# Patient Record
Sex: FEMALE | Race: WHITE | Employment: OTHER | URBAN - NONMETROPOLITAN AREA
[De-identification: names, ages, dates, MRNs, and addresses within clinical notes are randomized per-mention and may not be internally consistent; named-entity substitution may affect disease eponyms.]

---

## 2018-07-19 ENCOUNTER — APPOINTMENT (OUTPATIENT)
Dept: CT IMAGING | Age: 43
End: 2018-07-19
Payer: COMMERCIAL

## 2018-07-19 ENCOUNTER — HOSPITAL ENCOUNTER (EMERGENCY)
Age: 43
Discharge: HOME | End: 2018-07-19
Attending: EMERGENCY MEDICINE
Payer: COMMERCIAL

## 2018-07-19 ENCOUNTER — APPOINTMENT (OUTPATIENT)
Dept: GENERAL RADIOLOGY | Age: 43
End: 2018-07-19
Payer: COMMERCIAL

## 2018-07-19 VITALS
WEIGHT: 147 LBS | SYSTOLIC BLOOD PRESSURE: 155 MMHG | BODY MASS INDEX: 23.07 KG/M2 | HEART RATE: 81 BPM | DIASTOLIC BLOOD PRESSURE: 103 MMHG | OXYGEN SATURATION: 100 % | HEIGHT: 67 IN | RESPIRATION RATE: 20 BRPM
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
## 2018-07-19 DIAGNOSIS — M54.2 NECK PAIN: ICD-10-CM

## 2018-07-19 DIAGNOSIS — M54.50 ACUTE BILATERAL LOW BACK PAIN WITHOUT SCIATICA: ICD-10-CM

## 2018-07-19 PROCEDURE — 72125 CT NECK SPINE W/O DYE: CPT

## 2018-07-19 PROCEDURE — 71046 X-RAY EXAM CHEST 2 VIEWS: CPT

## 2018-07-19 PROCEDURE — 6370000000 HC RX 637 (ALT 250 FOR IP): Performed by: EMERGENCY MEDICINE

## 2018-07-19 PROCEDURE — 99284 EMERGENCY DEPT VISIT MOD MDM: CPT

## 2018-07-19 PROCEDURE — 72100 X-RAY EXAM L-S SPINE 2/3 VWS: CPT

## 2018-07-19 RX ORDER — IBUPROFEN 600 MG/1
600 TABLET ORAL ONCE
Status: COMPLETED | OUTPATIENT
Start: 2018-07-19 | End: 2018-07-19

## 2018-07-19 RX ADMIN — IBUPROFEN 600 MG: 600 TABLET ORAL at 18:37

## 2018-07-19 ASSESSMENT — PAIN SCALES - GENERAL
PAINLEVEL_OUTOF10: 8
PAINLEVEL_OUTOF10: 7

## 2018-07-19 ASSESSMENT — PAIN DESCRIPTION - LOCATION
LOCATION: BACK
LOCATION_2: NECK

## 2018-07-19 ASSESSMENT — PAIN DESCRIPTION - INTENSITY: RATING_2: 3

## 2018-07-19 ASSESSMENT — PAIN DESCRIPTION - PAIN TYPE: TYPE: ACUTE PAIN

## 2018-07-19 ASSESSMENT — PAIN DESCRIPTION - ORIENTATION: ORIENTATION: LOWER

## 2018-07-19 ASSESSMENT — PAIN DESCRIPTION - DESCRIPTORS: DESCRIPTORS: CONSTANT;ACHING
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
## 2022-10-28 ENCOUNTER — HOSPITAL ENCOUNTER (EMERGENCY)
Age: 47
Discharge: HOME | End: 2022-10-28
Attending: EMERGENCY MEDICINE
Payer: COMMERCIAL

## 2022-10-28 ENCOUNTER — APPOINTMENT (OUTPATIENT)
Dept: CT IMAGING | Age: 47
End: 2022-10-28
Payer: COMMERCIAL

## 2022-10-28 VITALS
SYSTOLIC BLOOD PRESSURE: 126 MMHG | RESPIRATION RATE: 18 BRPM | BODY MASS INDEX: 28.88 KG/M2 | OXYGEN SATURATION: 93 % | WEIGHT: 184 LBS | TEMPERATURE: 97.6 F | HEART RATE: 79 BPM | HEIGHT: 67 IN | DIASTOLIC BLOOD PRESSURE: 68 MMHG

## 2022-10-28 DIAGNOSIS — N83.201 CYST OF RIGHT OVARY: ICD-10-CM

## 2022-10-28 DIAGNOSIS — S00.83XA CONTUSION OF FOREHEAD, INITIAL ENCOUNTER: ICD-10-CM
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
## 2022-10-28 LAB
ANION GAP SERPL CALCULATED.3IONS-SCNC: 11 MMOL/L (ref 9–17)
BUN BLDV-MCNC: 18 MG/DL (ref 6–20)
BUN/CREAT BLD: 25 (ref 9–20)
CALCIUM SERPL-MCNC: 8.9 MG/DL (ref 8.6–10.4)
CHLORIDE BLD-SCNC: 107 MMOL/L (ref 98–107)
CO2: 24 MMOL/L (ref 20–31)
CREAT SERPL-MCNC: 0.71 MG/DL (ref 0.5–0.9)
ETHANOL PERCENT: 0.23 %
ETHANOL: 233 MG/DL
GFR SERPL CREATININE-BSD FRML MDRD: >60 ML/MIN/1.73M2
GLUCOSE BLD-MCNC: 117 MG/DL (ref 70–99)
HCT VFR BLD CALC: 41.5 % (ref 36.3–47.1)
HEMOGLOBIN: 14.1 G/DL (ref 11.9–15.1)
MCH RBC QN AUTO: 32 PG (ref 25.2–33.5)
MCHC RBC AUTO-ENTMCNC: 34 G/DL (ref 28.4–34.8)
MCV RBC AUTO: 94.3 FL (ref 82.6–102.9)
NRBC AUTOMATED: 0 PER 100 WBC
PDW BLD-RTO: 12.6 % (ref 11.8–14.4)
PLATELET # BLD: 173 K/UL (ref 138–453)
PMV BLD AUTO: 10.4 FL (ref 8.1–13.5)
POTASSIUM SERPL-SCNC: 4 MMOL/L (ref 3.7–5.3)
RBC # BLD: 4.4 M/UL (ref 3.95–5.11)
SODIUM BLD-SCNC: 142 MMOL/L (ref 135–144)
WBC # BLD: 5.6 K/UL (ref 3.5–11.3)

## 2022-10-28 PROCEDURE — 70450 CT HEAD/BRAIN W/O DYE: CPT

## 2022-10-28 PROCEDURE — 85027 COMPLETE CBC AUTOMATED: CPT

## 2022-10-28 PROCEDURE — 36415 COLL VENOUS BLD VENIPUNCTURE: CPT

## 2022-10-28 PROCEDURE — 74177 CT ABD & PELVIS W/CONTRAST: CPT

## 2022-10-28 PROCEDURE — G0480 DRUG TEST DEF 1-7 CLASSES: HCPCS

## 2022-10-28 PROCEDURE — 80048 BASIC METABOLIC PNL TOTAL CA: CPT

## 2022-10-28 PROCEDURE — 6360000004 HC RX CONTRAST MEDICATION: Performed by: EMERGENCY MEDICINE

## 2022-10-28 PROCEDURE — 99285 EMERGENCY DEPT VISIT HI MDM: CPT

## 2022-10-28 PROCEDURE — 72125 CT NECK SPINE W/O DYE: CPT

## 2022-10-28 RX ORDER — ACETAMINOPHEN 325 MG/1
650 TABLET ORAL EVERY 6 HOURS PRN
Qty: 40 TABLET | Refills: 0 | Status: SHIPPED | OUTPATIENT
Start: 2022-10-28

## 2022-10-28 RX ORDER — IBUPROFEN 800 MG/1
800 TABLET ORAL EVERY 8 HOURS PRN
Qty: 21 TABLET | Refills: 0 | Status: SHIPPED | OUTPATIENT
Start: 2022-10-28

## 2022-10-28 RX ORDER — CYCLOBENZAPRINE HCL 10 MG
10 TABLET ORAL 3 TIMES DAILY PRN
Qty: 21 TABLET | Refills: 0 | Status: SHIPPED | OUTPATIENT
Start: 2022-10-28 | End: 2022-11-07

## 2022-10-28 RX ADMIN — IOPAMIDOL 75 ML: 755 INJECTION, SOLUTION INTRAVENOUS at 21:26

## 2022-10-28 ASSESSMENT — PAIN SCALES - GENERAL: PAINLEVEL_OUTOF10: 10

## 2022-10-28 ASSESSMENT — PAIN - FUNCTIONAL ASSESSMENT: PAIN_FUNCTIONAL_ASSESSMENT: 0-10

## 2022-10-28 ASSESSMENT — PAIN DESCRIPTION - ORIENTATION: ORIENTATION: LEFT

## 2022-10-28 ASSESSMENT — PAIN DESCRIPTION - LOCATION: LOCATION: RIB CAGE

## 2022-10-29 NOTE — ED NOTES
Called CT to see when patient can come to CT. Pt second on list to go for scans.   Pt A&O and resting comfortably      Estee Hager RN  10/28/22 5545
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
## 2022-10-29 NOTE — ED NOTES
Patient able to walk in hallway, gait steady. States able to go home.        Katie Luna RN  10/28/22 3938

## 2022-10-29 NOTE — DISCHARGE INSTRUCTIONS
Follow-up with your OB/GYN regarding your right ovarian cyst.  You will wake up tomorrow in significant pain. Please use pain medication as prescribed to help with your discomfort, please take muscle relaxant to help also with your discomfort okay to apply ice to areas of pain and swelling, and a use your incentive spirometer to help with inhalation and exhalation. Please use this every hour throughout the day. Return to ER for worsening pain shortness of breath difficulty breathing, numbness, tingling or weakness.

## 2022-10-29 NOTE — ED NOTES
Pt given incentive spirometer and demonstrated appropriate use.   Pt ambulatory without difficutly upon dc and no questions voiced     Maine Zuñiga RN  10/28/22 3687
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
## 2022-10-30 ASSESSMENT — ENCOUNTER SYMPTOMS
COUGH: 0
NAUSEA: 0
SORE THROAT: 0
CONSTIPATION: 0
ABDOMINAL DISTENTION: 0
WHEEZING: 0
RHINORRHEA: 0
DIARRHEA: 0
VOMITING: 0
SHORTNESS OF BREATH: 0

## 2022-12-13 ENCOUNTER — OFFICE VISIT (OUTPATIENT)
Dept: GASTROENTEROLOGY | Age: 47
End: 2022-12-13

## 2022-12-13 VITALS
WEIGHT: 188.3 LBS | BODY MASS INDEX: 29.55 KG/M2 | RESPIRATION RATE: 16 BRPM | SYSTOLIC BLOOD PRESSURE: 150 MMHG | HEIGHT: 67 IN | TEMPERATURE: 97.2 F | HEART RATE: 75 BPM | DIASTOLIC BLOOD PRESSURE: 94 MMHG

## 2022-12-13 DIAGNOSIS — K58.0 IRRITABLE BOWEL SYNDROME WITH DIARRHEA: Primary | ICD-10-CM

## 2022-12-13 RX ORDER — ESCITALOPRAM OXALATE 10 MG/1
10 TABLET ORAL DAILY
COMMUNITY

## 2022-12-13 RX ORDER — HYOSCYAMINE SULFATE 0.125 MG
125 TABLET ORAL 2 TIMES DAILY PRN
COMMUNITY

## 2022-12-13 ASSESSMENT — ENCOUNTER SYMPTOMS: DIARRHEA: 1

## 2022-12-13 NOTE — PROGRESS NOTES
[facility] PART OF [place] [address] [address] [place]  Dept: [phone]    Chief Complaint   Patient presents with    New Patient     IBS-has been seeing a GI is Kellie Amin who is leaving so wanted to change to here. Doing well on Hyoscyamine. Has had a colonoscopy around June 2020-normal. Denies family hx of colon cancer but daughter does have Crohn's. HPI     Lisandro Gay is a 50 y.o. female with a past medical history of irritable bowel syndrome who presents to [facility]. She states she was diagnosed with IBS diarrhea which depends on what she eats. She states that she has been on hyoscyamine which has been helpful. She states that she also has been following a version of FODMAP diet which she memorized. She endorses about 1-2 bowel movement a day. She reports that her last colonoscopy as in 06/2020 with no concerning findings. She reports that her daughter has Crohn's disease. Past Medical History:   Diagnosis Date    IBS (irritable bowel syndrome)     Irritable bowel syndrome         Past Surgical History:   Procedure Laterality Date    CHOLECYSTECTOMY  02/2014    LAPAROSCOPY      for a \"kinked bowel\"    OTHER SURGICAL HISTORY      for varicose veins left ovary        Family History   Problem Relation Age of Onset    Cancer Mother     High Blood Pressure Mother     Diabetes Mother     Cancer Father     Celiac Disease Sister     Crohn's Disease Daughter         Review of Systems   Gastrointestinal:  Positive for diarrhea. Psychiatric/Behavioral:  The patient is nervous/anxious. Depression   All other systems reviewed and are negative.      BP (!) 150/94 (Site: Left Upper Arm, Position: Sitting, Cuff Size: Medium Adult)   Pulse 75   Temp 97.2 °F (36.2 °C) (Temporal)   Resp 16   Ht 5' 7\" (1.702 m)   Wt 188 lb 4.8 oz (85.4 kg)   BMI 29.49 kg/m²      Physical Exam  Constitutional:       Appearance: Normal appearance. HENT:      Head: Normocephalic. Right Ear: External ear normal.      Left Ear: External ear normal.      Nose: Nose normal.   Eyes:      Extraocular Movements: Extraocular movements intact. Pupils: Pupils are equal, round, and reactive to light. Cardiovascular:      Rate and Rhythm: Normal rate. Pulses: Normal pulses. Pulmonary:      Effort: Pulmonary effort is normal.   Abdominal:      General: Bowel sounds are normal.      Palpations: Abdomen is soft. Musculoskeletal:         General: Normal range of motion. Cervical back: Normal range of motion. Skin:     General: Skin is warm. Neurological:      General: No focal deficit present. Mental Status: She is alert and oriented to person, place, and time. Psychiatric:         Mood and Affect: Mood normal.        Lab Results   Component Value Date    WBC 5.6 10/28/2022    HGB 14.1 10/28/2022    HCT 41.5 10/28/2022    MCV 94.3 10/28/2022     10/28/2022       Lab Results   Component Value Date     10/28/2022    K 4.0 10/28/2022     10/28/2022    CO2 24 10/28/2022    BUN 18 10/28/2022    CREATININE 0.71 10/28/2022    GLUCOSE 117 (H) 10/28/2022    CALCIUM 8.9 10/28/2022    LABGLOM >60 10/28/2022    GFRAA >60 12/06/2016       Assessment:  Gala Floyd is a 50 y.o. female with a past medical history of irritable bowel syndrome whose last colonoscopy was in 2020 with no concerning findings per her account. Considering that her symptoms are associated with her diet, FODMAP intolerance is a consideration. Gluten intolerance will need to be ruled out with Celiac disease testing. Plan:  1. Irritable bowel syndrome with diarrhea  - FODMAP intolerance was discussed with the provision of the FODMAP pamphlet. Details were provided on foods to avoid fr the next 6 weeks as well as foods that low or free of FODMAPs.  Ideally, the dietary discretion is for 6 weeks   - Endomysial Antibody, IgA; Future  - IgA; Future  - Tissue Transglutaminase, IgA; Future    2. Follow-up in 8 weeks or sooner    Spent 20 minutes with the patient with greater than 50 percent of the time was spent on face-to-face time in discussion with the patient regarding diagnostic options/results, treatment options, counseling, and follow-up plan.   Electronically signed by Polly Victoria MD on 12/13/22 at 4:07 PM EST

## 2022-12-13 NOTE — PATIENT INSTRUCTIONS
SURVEY:    You may be receiving a survey from [email or web address] regarding your visit today. Please complete the survey to enable us to provide the highest quality of care to you and your family. If you cannot score us a very good on any question, please call the office to discuss how we could have made your experience a very good one. Thank you.

## 2023-03-14 ENCOUNTER — OFFICE VISIT (OUTPATIENT)
Dept: GASTROENTEROLOGY | Age: 48
End: 2023-03-14
Payer: COMMERCIAL

## 2023-03-14 VITALS
WEIGHT: 193.8 LBS | HEIGHT: 67 IN | DIASTOLIC BLOOD PRESSURE: 92 MMHG | RESPIRATION RATE: 18 BRPM | BODY MASS INDEX: 30.42 KG/M2 | SYSTOLIC BLOOD PRESSURE: 152 MMHG | TEMPERATURE: 97.8 F | HEART RATE: 79 BPM

## 2023-03-14 DIAGNOSIS — K58.9 IRRITABLE BOWEL SYNDROME WITHOUT DIARRHEA: Primary | ICD-10-CM

## 2023-03-14 PROCEDURE — 99212 OFFICE O/P EST SF 10 MIN: CPT | Performed by: INTERNAL MEDICINE

## 2023-03-14 RX ORDER — LISINOPRIL 20 MG/1
20 TABLET ORAL DAILY
COMMUNITY

## 2023-03-14 RX ORDER — PHENTERMINE HYDROCHLORIDE 37.5 MG/1
37.5 TABLET ORAL
COMMUNITY

## 2023-03-14 NOTE — PATIENT INSTRUCTIONS
SURVEY:    You may be receiving a survey from [employer] regarding your visit today. Please complete the survey to enable us to provide the highest quality of care to you and your family. If you cannot score us a very good on any question, please call the office to discuss how we could have made your experience a very good one. Thank you.

## 2023-03-14 NOTE — PROGRESS NOTES
[facility] [place] [address] [address]  Dept: [phone]    Chief Complaint   Patient presents with    Follow-up     F/u IBS-doing much better-denies any diarrhea. HPI from 12/13/2022  Wojciech Delgado is a 50 y.o. female with a past medical history of irritable bowel syndrome who presents to [facility] care. She states she was diagnosed with IBS diarrhea which depends on what she eats. She states that she has been on hyoscyamine which has been helpful. She states that she also has been following a version of FODMAP diet which she memorized. She endorses about 1-2 bowel movement a day. She reports that her last colonoscopy as in 06/2020 with no concerning findings. She reports that her daughter has Crohn's disease. Interval history 03/14/2023  She states that she has noticed that she is no longer bloated or having diarrhea since she completed the FODMAP diet - eliminating onions and brussel sprouts    Past Medical History:   Diagnosis Date    IBS (irritable bowel syndrome)     Irritable bowel syndrome         Past Surgical History:   Procedure Laterality Date    CHOLECYSTECTOMY  02/2014    LAPAROSCOPY      for a \"kinked bowel\"    OTHER SURGICAL HISTORY      for varicose veins left ovary        Family History   Problem Relation Age of Onset    Cancer Mother     High Blood Pressure Mother     Diabetes Mother     Cancer Father     Celiac Disease Sister     Crohn's Disease Daughter         Review of Systems   Gastrointestinal:         Irritable bowel syndrome   All other systems reviewed and are negative.      BP (!) 152/92 (Site: Left Upper Arm, Position: Sitting, Cuff Size: Medium Adult)   Pulse 79   Temp 97.8 °F (36.6 °C) (Temporal)   Resp 18   Ht 5' 7\" (1.702 m)   Wt 193 lb 12.8 oz (87.9 kg)   BMI 30.35 kg/m²      Physical Exam  Constitutional:       Appearance: Normal appearance. HENT:      Head: Normocephalic. Right Ear: External ear normal.      Left Ear: External ear normal.      Nose: Nose normal.      Mouth/Throat:      Mouth: Mucous membranes are moist.   Eyes:      Extraocular Movements: Extraocular movements intact. Pupils: Pupils are equal, round, and reactive to light. Cardiovascular:      Rate and Rhythm: Normal rate and regular rhythm. Pulses: Normal pulses. Heart sounds: Normal heart sounds. Pulmonary:      Effort: Pulmonary effort is normal.   Abdominal:      General: Bowel sounds are normal.      Palpations: Abdomen is soft. Musculoskeletal:         General: Normal range of motion. Cervical back: Normal range of motion. Skin:     General: Skin is warm. Neurological:      General: No focal deficit present. Mental Status: She is alert. Psychiatric:         Mood and Affect: Mood normal.        Lab Results   Component Value Date    WBC 5.6 10/28/2022    HGB 14.1 10/28/2022    HCT 41.5 10/28/2022    MCV 94.3 10/28/2022     10/28/2022       Lab Results   Component Value Date     10/28/2022    K 4.0 10/28/2022     10/28/2022    CO2 24 10/28/2022    BUN 18 10/28/2022    CREATININE 0.71 10/28/2022    GLUCOSE 117 (H) 10/28/2022    CALCIUM 8.9 10/28/2022    LABGLOM >60 10/28/2022    GFRAA >60 12/06/2016       Assessment:  Trice Alvarez is a 50 y.o. female with a past medical history of irritable bowel syndrome whose last colonoscopy was in 2020 with no concerning findings per her account. FODMAP diet offered her complete resolution of her bloating and abdominal cramps. She states that she is no longer taking hyoscyamine. Plan:  IBS: FODMAP intolerance established per patient. Cessation of onions and brussel sprouts provided complete relief.    F/U as needed    Spent 15 minutes with the patient with greater than 50 percent of the time was spent on face-to-face time in discussion with the patient regarding diagnostic options/results, treatment options, counseling, and follow-up plan.   Electronically signed by Sea Thompson MD on 3/14/23 at 4:21 PM EDT

## 2023-11-04 ENCOUNTER — HOSPITAL ENCOUNTER
Dept: HOSPITAL 101 - LAB | Age: 48
Discharge: HOME | End: 2023-11-04
Payer: COMMERCIAL

## 2023-11-04 DIAGNOSIS — Z00.00: Primary | ICD-10-CM

## 2023-11-04 DIAGNOSIS — E78.5: ICD-10-CM

## 2023-11-04 DIAGNOSIS — R73.09: ICD-10-CM

## 2023-11-04 LAB
ADD MANUAL DIFF: NO
ALANINE AMINOTRANSFERASE: 59 U/L (ref 14–59)
ALBUMIN GLOBULIN RATIO: 1.2
ALBUMIN LEVEL: 4 G/DL (ref 3.4–5)
ALKALINE PHOSPHATASE: 64 U/L (ref 46–116)
ANION GAP: 14.2
ASPARTATE AMINO TRANSFERASE: 21 U/L (ref 15–37)
BLOOD UREA NITROGEN: 12 MG/DL (ref 7–18)
CALCIUM: 8.9 MG/DL (ref 8.5–10.1)
CARBON DIOXIDE: 26.9 MMOL/L (ref 21–32)
CHLORIDE: 98 MMOL/L (ref 98–107)
CHOLESTEROL: 234 MG/DL (ref ?–200)
CO2 BLD-SCNC: 26.9 MMOL/L (ref 21–32)
ESTIMATED GFR (AFRICAN AMERICA: >60 (ref 60–?)
ESTIMATED GFR (NON-AFRICAN AME: >60 (ref 60–?)
FREE T3: 2.27 PG/ML (ref 2.18–3.98)
GLOBULIN: 3.4 G/DL
GLUCOSE BLD-MCNC: 124 MG/DL (ref 74–106)
HCT VFR BLD CALC: 41.4 % (ref 36–48)
HDL CHOLESTEROL: 57 MG/DL (ref 40–60)
HEMATOCRIT: 41.4 % (ref 36–48)
HEMOGLOBIN: 13.8 G/DL (ref 12–16)
IMMATURE GRANULOCYTES ABS AUTO: 0.02 10^3/UL (ref 0–0.03)
IMMATURE GRANULOCYTES PCT AUTO: 0.3 % (ref 0–0.5)
LYMPHOCYTES  ABSOLUTE AUTO: 0.9 10^3/UL (ref 1.2–3.8)
MCV RBC: 92.4 FL (ref 81–99)
MEAN CORPUSCULAR HEMOGLOBIN: 30.8 PG (ref 26.7–34)
MEAN CORPUSCULAR HGB CONC: 33.3 G/DL (ref 29.9–35.2)
MEAN CORPUSCULAR VOLUME: 92.4 FL (ref 81–99)
PLATELET # BLD: 265 10^3/UL (ref 150–450)
PLATELET COUNT: 265 10^3/UL (ref 150–450)
POTASSIUM SERPLBLD-SCNC: 4.1 MMOL/L (ref 3.5–5.1)
POTASSIUM: 4.1 MMOL/L (ref 3.5–5.1)
RED BLOOD COUNT: 4.48 10^6/UL (ref 4.2–5.4)
SODIUM BLD-SCNC: 135 MMOL/L (ref 136–145)
SODIUM: 135 MMOL/L (ref 136–145)
THYROID STIMULATING HORMONE: 0.6 UIU/ML (ref 0.36–3.74)
TOTAL PROTEIN: 7.4 G/DL (ref 6.4–8.2)
TRIGLYCERIDES: 64 MG/DL (ref ?–150)
VLDL CHOLESTEROL: 12.8 MG/DL
WBC # BLD: 6.5 10^3/UL (ref 4–11)
WHITE BLOOD COUNT: 6.5 10^3/UL (ref 4–11)

## 2023-11-04 PROCEDURE — 83525 ASSAY OF INSULIN: CPT

## 2023-11-04 PROCEDURE — 83036 HEMOGLOBIN GLYCOSYLATED A1C: CPT

## 2023-11-04 PROCEDURE — 84481 FREE ASSAY (FT-3): CPT

## 2023-11-04 PROCEDURE — 80061 LIPID PANEL: CPT

## 2023-11-04 PROCEDURE — 36415 COLL VENOUS BLD VENIPUNCTURE: CPT

## 2023-11-04 PROCEDURE — 84436 ASSAY OF TOTAL THYROXINE: CPT

## 2023-11-04 PROCEDURE — 85025 COMPLETE CBC W/AUTO DIFF WBC: CPT

## 2023-11-04 PROCEDURE — 84443 ASSAY THYROID STIM HORMONE: CPT

## 2023-11-04 PROCEDURE — 80053 COMPREHEN METABOLIC PANEL: CPT

## 2024-10-02 ENCOUNTER — HOSPITAL ENCOUNTER
Dept: HOSPITAL 101 - RAD | Age: 49
Discharge: HOME | End: 2024-10-02
Payer: COMMERCIAL

## 2024-10-02 DIAGNOSIS — M25.559: Primary | ICD-10-CM

## 2024-10-02 DIAGNOSIS — M16.0: ICD-10-CM

## 2024-10-02 PROCEDURE — 73502 X-RAY EXAM HIP UNI 2-3 VIEWS: CPT

## 2024-10-02 NOTE — XMS_ITS
Comprehensive CCD (C-CDA v2.1)  
  
                           Created on: 2024  
  
  
MALLORYJAYSON MARIANNA ИРИНА  
External Reference #: CDR,PersonID:[id number]  
: 1975  
Sex: Undifferentiated  
  
Demographics  
  
  
                                        Address             [address] [place] [address] 3  
8  
[place]  [address]  
   
                                        Home Phone          [phone]  
   
                                        Work Phone          [phone]  
   
                                        Preferred Language  en  
   
                                        Marital Status        
   
                                        Orthodoxy Affiliation Unknown  
   
                                        Race                White  
   
                                        Ethnic Group        Not  or Lati  
no  
  
  
Author  
  
  
                                        Organization        [facility]  
  
  
Care Team Providers  
  
  
                                Care Team Member Name Role            Phone  
   
                                Elen Wood MD Primary Care Provider [phone]  
[phone]  
   
                                ELEN WOOD  Primary Care    Unavailable  
   
                                SHANON NOLAND Attending       Unavailable  
   
                                FAUSTINA, DR MYRICK Admitting       Unavailable  
   
                                FAUSTINA, DR MYRICK Attending       Unavailable  
   
                                DOUGY, DR MYRICK Primary Care    Unavailable  
   
                                HOY, DR MYRICK Admitting       Unavailable  
   
                                HOY, DR MYRICK Attending       Unavailable  
   
                                FAUSTINA, DR MYRICK Primary Care    Unavailable  
   
                                FAUSTINA, DR MYRICK Admitting       Unavailable  
   
                                FAUSTINA, DR MYRICK Attending       Unavailable  
   
                                FAUSTINA, DR MYRICK Primary Care    Unavailable  
   
                                HOY, DR MYRICK Consulting      Unavailable  
   
                                LUISA CHRISTIANSON Attending       Unavailable  
   
                                NOLAN WOODLAS AILEEN  Referring       Unavailable  
   
                                Elen Wood MD Primary Care Provider [phone]  
[phone]  
   
                                ELEN WOOD  Referring       Unavailable  
   
                                HOY, ELEN M  Primary Care    Unavailable  
   
                                MARC GALLEGOS Attending       Unavailable  
   
                                MARC GALLEGOS Referring       Unavailable  
   
                                HOY, ELEN M  Primary Care    Unavailable  
   
                                MARC GALLEGOS Attending       Unavailable  
   
                                MARC GALLEGOS Referring       Unavailable  
   
                                HOY, ELEN M  Primary Care    Unavailable  
   
                                DEDRICK LEBLANC Referring       Unavailable  
   
                                HOY, ELEN M  Primary Care    Unavailable  
   
                                MARC GALLEGOS Attending       Unavailable  
   
                                EL, MARC M Referring       Unavailable  
   
                                HOY, ELEN M  Primary Care    Unavailable  
   
                                MARC GALLEGOS Attending       Unavailable  
   
                                MARC GALLEGOS Referring       Unavailable  
   
                                HOY, ELEN M  Primary Care    Unavailable  
   
                                DEDRICK LEBLANC S Referring       Unavailable  
   
                                HOY, ELEN M  Primary Care    Unavailable  
  
  
  
Medications  
Current Medications  
  
  
  
                      Medication Drug Class(es) Dates      Sig (Normalized) Sig   
(Original)  
   
                                                    acetaminophen 325 mg   
oral tablet  
(1 source)                                          Start:   
10-                              take 2 tablets by   
mouth every six   
hours as needed   
for pain                                acetaminophen   
(TYLENOL) 325 MG   
tablet Take 2   
tablets by mouth   
every 6 hours as   
needed for Pain 40   
tablet 0 10/28/2022   
Active  
   
                                                    cyclobenzaprine   
hydrochloride 10 mg   
oral tablet  
(1 source)                Muscle Relaxant           Start:   
10-  
End:   
2022                              take 1 tablet by   
mouth three times   
daily as needed   
for muscle spasms                       cyclobenzaprine   
(FLEXERIL) 10 MG   
tablet Take 1 tablet   
by mouth 3 times   
daily as needed for   
Muscle spasms 21   
tablet 0 10/28/2022   
2022 Active  
   
                                                    diclofenac sodium 75   
mg / miSOPROStol 0.2   
mg delayed release   
oral tablet  
(1 source)                              Nonsteroidal   
Anti-inflammatory   
Drug,   
Prostaglandin E1   
Analog                                              take 1 tablet by   
mouth twice daily                       diclofenac-Misoprost  
ol (ARTHROTEC 75)   
75-0.2 MG per tablet   
Take 1 tablet by   
mouth 2 times daily   
0 Active  
   
                                                    escitalopram 10 mg   
oral tablet  
(1 source)                              Serotonin Reuptake   
Inhibitor                                           take 2 tablets by   
mouth in the   
morning                                 escitalopram   
(Lexapro) 10 MG   
tablet Take 20 mg by   
mouth in the   
morning. 0 Active  
   
                                                    Hyoscyamine  
(1 source)                                                      Hyoscyamine Sulf  
ate   
(HYOSCYAMINE PO)   
Take by mouth daily   
0 Active  
   
                                                    ibuprofen 800 mg   
oral tablet  
(1 source)                              Nonsteroidal   
Anti-inflammatory   
Drug                                    Start:   
10-                              take 1 tablet by   
mouth every eight   
hours as needed   
for pain                                ibuprofen (IBU) 800   
MG tablet Take 1   
tablet by mouth   
every 8 hours as   
needed for Pain 21   
tablet 0 10/28/2022   
Active  
   
                                                    lisinopril 20 mg   
oral tablet  
(1 source)                              Angiotensin   
Converting Enzyme   
Inhibitor                                           take 1 tablet by   
mouth in the   
morning                                 lisinopril 20 MG   
tablet Take 20 mg by   
mouth in the   
morning. 0 Active  
   
                                                    Loratadine  
(1 source)                                                      Loratadine (CLAR  
ITIN   
PO) Take by mouth   
daily 0 Active  
   
                                                    Multiple   
Vitamins-Minerals   
(EMERGEN-C FIVE PO)  
(1 source)                                                      Multiple   
Vitamins-Minerals   
(EMERGEN-C FIVE PO)   
Take by mouth 0   
Active  
   
                                                    Probiotic Product   
(PROBIOTIC DAILY PO)  
(1 source)                                                      Probiotic Produc  
t   
(PROBIOTIC DAILY PO)   
Take by mouth daily   
0 Active  
  
  
  
Completed/Discontinued Medications  
  
  
  
                      Medication Drug Class(es) Dates      Sig (Normalized) Sig   
(Original)  
   
                                                    iopamidol   
(ISOVUE-370) 76 %   
injection 75 mL  
(1 source)                                          Start:   
10-  
End:   
10-                                          iopamidol   
(ISOVUE-370) 76 %   
injection 75 mL  
  
  
  
Problems  
  
  
                      Problem Classification Problem    Date       Documented Da  
te Episodic/Chronic  
   
                                                    E Codes: Fall  
(2 sources)                             Fall from steps ;   
Translations: [Fall   
(on) (from)   
unspecified stairs   
and steps, initial   
encounter]                              Onset:   
10-                                          Episodic  
   
                                                    Nonmalignant breast   
conditions  
(2 sources)                             Solitary cyst of   
right breast;   
Translations:   
[Unspecified lump   
in the right   
breast, unspecified   
quadrant]                               Onset:   
2024                                          Episodic  
   
                                                    Other female genital   
disorders  
(1 source)                              Pelvic congestion   
syndrome;   
Translations:   
[Other specified   
conditions   
associated with   
female genital   
organs and   
menstrual cycle]                        Onset:   
2024                Episodic  
   
                                                    Other gastrointestinal   
disorders  
(1 source)                              Irritable bowel   
syndrome;   
Translations:   
[Irritable bowel   
syndrome without   
diarrhea]                               Onset:   
2024                Chronic  
   
                                                    Other screening for   
suspected conditions   
(not mental disorders   
or infectious disease)  
(4 sources)                             Encounter for   
screening for   
malignant neoplasm   
of vagina;   
Translations:   
[Other abnormal and   
inconclusive   
findings on   
diagnostic imaging   
of breast]                              Onset:   
2024                                          Episodic  
   
                                                    Other upper respiratory   
infections  
(1 source)                              Chronic sinusitis;   
Translations:   
[Chronic sinusitis,   
unspecified]                            Onset:   
2024                Chronic  
   
                                                    Ovarian cyst  
(2 sources)                             Cyst of right   
ovary;   
Translations:   
[Unspecified   
ovarian cyst, right   
side]                                   Onset:   
10-                                          Episodic  
   
                                                    Peripheral and visceral   
atherosclerosis  
(1 source)                              Intermittent   
claudication of   
bilateral lower   
limbs co-occurrent   
and due to   
atherosclerosis;   
Translations:   
[Atherosclerosis of   
native arteries of   
extremities with   
intermittent   
claudication,   
bilateral legs]                         Onset:   
2024                Chronic  
   
                                                    Superficial injury;   
contusion  
(2 sources)                             Contusion of   
forehead;   
Translations:   
[Contusion of other   
part of head,   
initial encounter]                      Onset:   
10-                                          Episodic  
   
                                                    Unclassified  
(1 source)                Annual Exam               Onset:   
2024                                            
  
  
  
Results  
  
  
                          Test Name    Value        Interpretation Reference   
[facility]                                   Facility  
   
                                                    MAMM DIAGNOSTIC UNILAT RT W   
CADon 2024   
   
                                                    MAMM DIAGNOSTIC UNILAT   
RT W CAD                                MAMM DIAGNOSTIC   
UNILAT RT W CAD  
EXAM: MAMM   
DIAGNOSTIC UNILAT   
RT W CAD, US BREAST   
RT LIMITED,   
2024 2:05 PM  
CLINICAL   
INDICATIONS:   
Short-term   
follow-up right   
breast masses  
COMPARISON:   
Ultrasound and   
mammogram from   
2024  
TECHNIQUE:  
Supplemental views   
of the right breast   
were obtained for   
diagnostic workup.   
Digital   
tomosynthesis   
images were   
obtained, with   
creation of   
synthetic 2D views.   
Computer aided   
detection was   
utilized.  
FINDINGS:  
There are scattered   
areas of   
fibroglandular   
density.  
A 2 nodular breast   
masses as seen on   
prior mammogram   
appears similar   
from prior. The   
more anterior and   
lateral mass   
measures roughly 9   
mm, similar from   
prior exam, this is   
located at a   
posterior depth   
roughly 10:00   
position.  
Second nodular mass   
more posteriorly   
roughly the level   
of the nipple   
posterior depth   
measures 9 mm as   
well.  
___________________  
___________________  
__________  
Right Breast   
Ultrasound, Limited  
FINDINGS:  
There is a breast   
cyst measuring 5 x   
6 x 3 mm at the   
10:00 position 7 cm   
from the nipple,   
this corresponds to   
the more anterior   
mass.  
The more posterior   
mass is not   
visualized.  
___________________  
___________________  
__________  
COMBINED   
IMPRESSION:  
Probably benign.   
Unchanged masses by   
mammography. The   
more anterior mass   
corresponds to a   
cyst by ultrasound.   
The more posterior   
mass is unchanged   
from prior   
mammogram however   
not seen   
sonographically.   
Six-month mammogram   
follow-up   
recommended  
BI-RADS: BI-RADS 3   
- Probably Benign  
Recommendation:   
Repeat diagnostic   
mammogram in 6   
months  
Patient was given   
the results before   
leaving the   
department.  
Workstation:[id number]  
5  
Finalized by Antonino Wolfe MD on   
2024 3:25 PM  
3  
b  
MAMM 6 MONTH        Normal                                  [facility]  
   
                                                    US BREAST RT LIMITEDon    
   
                                        US BREAST RT LIMITED US BREAST RT   
LIMITED  
EXAM: MAMM   
DIAGNOSTIC UNILAT   
RT W CAD, US BREAST   
RT LIMITED,   
2024 2:05 PM  
CLINICAL   
INDICATIONS:   
Short-term   
follow-up right   
breast masses  
COMPARISON:   
Ultrasound and   
mammogram from   
2024  
TECHNIQUE:  
Supplemental views   
of the right breast   
were obtained for   
diagnostic workup.   
Digital   
tomosynthesis   
images were   
obtained, with   
creation of   
synthetic 2D views.   
Computer aided   
detection was   
utilized.  
FINDINGS:  
There are scattered   
areas of   
fibroglandular   
density.  
A 2 nodular breast   
masses as seen on   
prior mammogram   
appears similar   
from prior. The   
more anterior and   
lateral mass   
measures roughly 9   
mm, similar from   
prior exam, this is   
located at a   
posterior depth   
roughly 10:00   
position.  
Second nodular mass   
more posteriorly   
roughly the level   
of the nipple   
posterior depth   
measures 9 mm as   
well.  
___________________  
___________________  
__________  
Right Breast   
Ultrasound, Limited  
FINDINGS:  
There is a breast   
cyst measuring 5 x   
6 x 3 mm at the   
10:00 position 7 cm   
from the nipple,   
this corresponds to   
the more anterior   
mass.  
The more posterior   
mass is not   
visualized.  
___________________  
___________________  
__________  
COMBINED   
IMPRESSION:  
Probably benign.   
Unchanged masses by   
mammography. The   
more anterior mass   
corresponds to a   
cyst by ultrasound.   
The more posterior   
mass is unchanged   
from prior   
mammogram however   
not seen   
sonographically.   
Six-month mammogram   
follow-up   
recommended  
BI-RADS: BI-RADS 3   
- Probably Benign  
Recommendation:   
Repeat diagnostic   
mammogram in 6   
months  
Patient was given   
the results before   
leaving the   
department.  
Workstation:[id number]  
5  
Finalized by Antonino Wolfe MD on   
2024 3:25 PM  
3  
b  
MAMM 6 MONTH        Normal                                  [facility]  
   
                                                    Cytologyon 2024   
   
                      Cytology              Normal                [facility]  
   
                                        Comment on above:   Result Comment: [facility]  
Consultants in Laboratory Medicine  
[address] [place] [address]  
Tel: [phone] Fax: [phone]  
Gynecologic Cytology Consultation  
Patient Name:MARIANNA PAUL:1975 (Age:   
48)Gender:FTaken:4Reported:4Physician(s):JORGE ALBERTO LEBLANC, C.N.M. ([phone])Copy To:Accession   
#:[id number]. Rec. #:[id number]: #[id number]  
Final Cytologic Interpretation  
ThinPrep Pap Test (Cervical):  
Satisfactory for evaluation. A transformation zone component is   
present.  
NEGATIVE FOR INTRAEPITHELIAL LESION OR MALIGNANCY.  
jja/2024  
Interpretation performed at [facility], [address] [place] [id number], License number: [id number].  
**Electronically Signed Out By  
LARISSA Anne(ASCP)**  
Date of Last Menstrual Period: (None Given)  
Other Clinical Conditions:  
Z01.419 Gyn exam wo/abn findings  
Z12.72 Screeing for malignant neoplasm of vagina  
Source of Specimen  
ThinPrep Pap Test (Cervical)  
Thin Prep Pap (GYN)  
Fee Code(s):  
   
   
                                                    HIGH RISK HPV W/GENOon    
   
                                                    HPV   
31+33+35+39+45+51+52+5  
6+58+59+66+68 DNA   
CANDICE+probe Ql (Cvx)                      HPV SPECIMEN TYPE  
ThinPrep  
  
HPV 16  
Negative (qualifier   
value)  
  
HPV 18  
Negative (qualifier   
value)  
  
OTHER HIGH RISK HPV  
Negative (qualifier   
value)  
HPV types   
31,33,35,39,45,52,5  
6,58,59,66  
and 68 DNA were   
undetectable.       Normal                                  [facility]  
   
                                        Comment on above:   Performed By: #### 7  
[id number] ####  
[facility] ([id number])  
[address] [place] [address], FIRST FLOOR  
[place], [place] [address] [place] [address] LAB ([id number])  
[address] [place] [address], [address]  
[place] [address]   
   
                                                    Physician Orderon 2024  
   
   
                                        Physician Order     [id number]  
[id number]  
A0#1.00TIFF         Normal                                  [facility]  
   
                                                    MAMM DIAGNOSTIC UNILAT RT W   
CADon 2024   
   
                                                    MAMM DIAGNOSTIC UNILAT   
RT W CAD                                MAMM DIAGNOSTIC   
UNILAT RT W CAD  
EXAM: MAMM   
DIAGNOSTIC UNILAT   
RT W CAD, 2024   
2:35 PM  
CLINICAL   
INDICATIONS: Mass   
of right breast,   
unspecified   
quadrant;   
Abnormality of   
right breast on   
screening   
mammogram,  
COMPARISON: Recent   
screening study and   
additional   
comparison  and   
  
TECHNIQUE:  
MLO and cc   
compression views   
with MLO projection   
and Tomosynthesis.  
Targeted ultrasound   
of the right breast   
also performed.  
FINDINGS:  
There are scattered   
areas of   
fibroglandular   
density.  
There are 2   
partially   
circumscribed   
masses in the right   
breast reproduced   
on compression.   
Both reside within   
the mid to   
posterior depth of   
the right breast.  
Slightly more   
anterior focus   
demonstrates   
sonographic   
correlate at the   
10:00 position and   
represents a   
slightly complex   
cyst 6 mm in   
greatest dimension   
7 cm from the   
nipple.  
The more posterior   
focus does not have   
sonographic   
correlate.  
IMPRESSION:  
Probable benign   
complex cysts. One   
of the 2 sites does   
not have a   
ultrasound   
correlated and 3   
follow-up   
recommended for   
further   
characterization.  
BI-RADS: BI-RADS 3   
- Probably Benign  
Recommendation:   
Repeat diagnostic   
mammogram in 3   
months  
Patient was given   
the results before   
leaving the   
department.  
Workstation:[id number]  
5  
Finalized by Boy Mora DO on   
2024 3:54 PM  
3  
b  
MAMM 3 MONTH        Normal                                  [facility]  
   
                                                    US BREAST RT LIMITEDon    
   
                                        US BREAST RT LIMITED US BREAST RT   
LIMITED  
EXAM: US BREAST RT   
LIMITED, 2024   
3:13 PM  
CLINICAL   
INDICATIONS:Mass of   
right breast,   
unspecified   
quadrant;   
Abnormality of   
right breast on   
screening   
mammogram.  
COMPARISON: Recent   
screening study and   
additional   
comparison  and   
  
TECHNIQUE: Multiple   
real-time   
gray-scale images   
of the right breast   
were performed.   
Color Doppler was   
utilized to assess   
vascular flow.  
FINDINGS:  
There are 2   
partially   
circumscribed   
masses in the right   
breast reproduced   
on compression.   
Both reside within   
the mid to   
posterior depth of   
the right breast.  
Slightly more   
anterior focus   
demonstrates   
sonographic   
correlate at the   
10:00 position and   
represents a   
complex cyst 6 mm   
in greatest   
dimension 7 cm from   
the nipple.  
The more posterior   
focus does not have   
sonographic   
correlate.  
IMPRESSION:  
Probable benign   
complex cysts. One   
of the 2 sites does   
not have a   
ultrasound   
correlated and 3   
follow-up   
recommended for   
further   
characterization.  
BI-RADS: BI-RADS 3   
- Probably Benign  
Recommendation:   
Ultrasound in 3   
months  
Patient was given   
the results before   
leaving the   
department  
Workstation:[id number]  
5  
Finalized by Boy Mora DO on   
2024 3:55 PM  
3  
US 3 MONTH          Normal                                  [facility]  
   
                                                    Physician Orderon 2024  
   
   
                                        Physician Order     [id number]  
[id number]  
34#1.00TIFF         Normal                                  [facility]  
   
                                                    MAMM SCREENING BILATERAL W C  
ADon 2024   
   
                                                    MAMM SCREENING   
BILATERAL W CAD                         MAMM SCREENING   
BILATERAL W CAD  
EXAM: MAMM   
SCREENING BILATERAL   
W CAD, 2024   
3:19 PM  
CLINICAL   
INDICATIONS:   
Screening, Well   
female exam with   
routine   
gynecological exam;   
Encounter for   
screening mammogram   
for malignant   
neoplasm of breast  
COMPARISON:   
Mammograms dating   
back to 2009  
TECHNIQUE:  
Bilateral digital   
tomosynthesis MLO   
and CC views of the   
breasts were   
obtained, with   
creation of   
synthetic 2D views.   
Computer aided   
detection was   
utilized.  
FINDINGS:  
There are scattered   
areas of   
fibroglandular   
density.  
There are no   
suspicious   
calcifications, or   
areas of   
architectural   
distortions.  
There are 2 small   
masses in the right   
breast recommend   
diagnostic   
mammographic views   
and ultrasound.  
IMPRESSION:  
Right breast: 2   
small masses.   
Recommend   
diagnostic   
mammographic views   
and ultrasound.  
Left breast:   
Negative.  
BI-RADS: BI-RADS 0   
- Incomplete. Needs   
additional imaging   
evaluation.  
Recommendation:   
Additional imaging   
required.  
Workstation:[id number]  
2  
Finalized by Ysabel Melendez MD on   
2024 4:03 PM  
0A  
b  
ADDITIONAL I        Normal                                  [facility]  
   
                                                    CT CERVICAL SPINE WO CONTRAS  
Ton 10-   
   
                                                    CT CERVICAL SPINE WO   
CONTRAST                                EXAMINATION:  
CT OF THE HEAD   
WITHOUT CONTRAST;   
CT OF THE CERVICAL   
SPINE WITHOUT   
CONTRAST;  
CT OF THE CHEST,   
ABDOMEN, AND PELVIS   
WITH CONTRAST   
10/28/2022 9:28 pm  
TECHNIQUE:  
CT of the head was   
performed without   
the administration   
of intravenous  
contrast. Automated   
exposure control,   
iterative   
reconstruction,   
and/or weight  
based adjustment of   
the mA/kV was   
utilized to reduce   
the radiation dose   
to as  
low as reasonably   
achievable.; CT of   
the cervical spine   
was performed   
without  
the administration   
of intravenous   
contrast.   
Multiplanar   
reformatted images  
are provided for   
review. Automated   
exposure control,   
iterative  
reconstruction,   
and/or weight based   
adjustment of the   
mA/kV was utilized   
to  
reduce the   
radiation dose to   
as low as   
reasonably   
achievable.; CT of   
the  
chest, abdomen and   
pelvis was   
performed with the   
administration of  
intravenous   
contrast.   
Multiplanar   
reformatted images   
are provided for   
review.  
Automated exposure   
control, iterative   
reconstruction,   
and/or weight based  
adjustment of the   
mA/kV was utilized   
to reduce the   
radiation dose to   
as low  
as reasonably   
achievable.  
COMPARISON:  
None.  
HISTORY:  
ORDERING SYSTEM   
PROVIDED HISTORY:   
fall down steps  
TECHNOLOGIST   
PROVIDED HISTORY:  
fall down steps  
Decision Support   
Exception -   
unselect if not a   
suspected or   
confirmed  
emergency medical   
condition->Emergenc  
y Medical Condition   
(MA)  
Is the patient   
pregnant?->No  
FINDINGS:  
Head CT:  
There is no acute   
infarction,   
intracranial   
hemorrhage or   
intracranial mass  
lesion. No mass   
effect, midline   
shift or   
extra-axial   
collection is   
noted.  
The brain   
parenchyma is   
normal. The   
cerebellar tonsils   
are in normal   
position.  
The ventricles,   
sulci, and cisterns   
are within normal   
limits in size and  
configuration.  
The globes and   
orbits are within   
normal limits. The   
visualized   
extracranial  
structures   
including paranasal   
sinuses and mastoid   
air cells are  
unremarkable. No   
fracture is   
identified. Central   
frontal   
subcutaneous  
hematoma.  
Cervical:  
BONES/ALIGNMENT:   
There is no acute   
fracture or   
traumatic   
malalignment.  
DEGENERATIVE   
CHANGES: Multilevel   
disc and facet   
degenerative   
disease most  
pronounced at C4-C5   
and C6-C7. At   
C4-C5, posterior   
osteophytic ridging   
and  
central disc   
protrusion.  
SOFT TISSUES: There   
is no prevertebral   
soft tissue   
swelling.  
CT chest:  
Lines and tubes:   
None.  
Lungs and Airways   
and Pleura: Central   
airways are patent.   
Linear  
ground-glass   
densities at the   
bases, likely   
suggestive of   
atelectasis. No  
lung consolidation.   
No pleural   
effusion. No   
pneumothorax.  
Lymph nodes: No   
pathologically   
enlarged   
mediastinal, hilar,   
lower cervical,  
or chest wall lymph   
nodes.  
Cardiovascular and   
Mediastinum: No   
acute aortic   
pathology. Cardiac   
chamber  
sizes appear to   
measure within   
normal limits on   
this non ECG gated   
study. No  
pericardial   
effusion. The   
thyroid gland is   
unremarkable. The   
esophagus is  
unremarkable.  
Bones/Soft tissues:   
No fracture. No   
definite suspicious   
lytic or blastic  
foci.  
CT abdomen and   
pelvis:  
Organs: 3.1 cm   
fibroid is arising   
from uterus. 6.5 x   
5.3 cm relatively   
dense  
right adnexal cyst.   
A status post   
cholecystectomy.   
The liver, spleen,  
adrenal glands,   
pancreas, kidneys   
and ureters and   
pelvic organs   
including  
the urinary bladder   
appear   
unremarkable.  
Peritoneum /   
Retroperitoneum: No   
free air or free   
fluid is noted. No  
pathologically   
enlarged   
lymphadenopathy.   
The vasculature do   
not demonstrate  
acute abnormality.   
Multiple coiling   
materials are noted   
along the course  
left gonadal vein.  
GI Tract: No   
distention or wall   
thickening.  
Bones and Soft   
Tissues: No   
fracture. No   
concerning lytic or   
sclerotic  
lesions are noted.  
IMPRESSION:  
Head CT: No acute   
intracranial   
abnormality. No   
evidence for acute  
intracranial   
hemorrhage,   
territorial   
infarction or   
intracranial mass   
lesion.  
Central frontal   
subcutaneous   
hematoma.  
Cervical CT: No   
acute abnormality   
of the cervical   
spine. No fracture.  
CT of the chest   
abdomen and pelvis:   
No acute traumatic   
injury within the  
chest abdomen and   
pelvis.  
Linear ground-glass   
densities at the   
bases likely   
suggestive of   
atelectasis.  
Fibroid uterus.  
6.5 cm relatively   
dense right adnexal   
cyst. Differential   
possibilities  
include   
endometrioma and   
hemorrhagic cyst.   
For further   
evaluation pelvic  
ultrasound   
examination is   
recommended.  
Interpreted by:  
Yanci Salgado MD  
Signed by:  
Yanci Salgado MD  
10/28/22  
Final result        Normal                                  [facility]  
   
                                                    CT CHEST ABDOMEN PELVIS W CO  
NTRASTon 10-   
   
                                                    CT CHEST ABDOMEN   
PELVIS W CONTRAST                       EXAMINATION:  
CT OF THE HEAD   
WITHOUT CONTRAST;   
CT OF THE CERVICAL   
SPINE WITHOUT   
CONTRAST;  
CT OF THE CHEST,   
ABDOMEN, AND PELVIS   
WITH CONTRAST   
10/28/2022 9:28 pm  
TECHNIQUE:  
CT of the head was   
performed without   
the administration   
of intravenous  
contrast. Automated   
exposure control,   
iterative   
reconstruction,   
and/or weight  
based adjustment of   
the mA/kV was   
utilized to reduce   
the radiation dose   
to as  
low as reasonably   
achievable.; CT of   
the cervical spine   
was performed   
without  
the administration   
of intravenous   
contrast.   
Multiplanar   
reformatted images  
are provided for   
review. Automated   
exposure control,   
iterative  
reconstruction,   
and/or weight based   
adjustment of the   
mA/kV was utilized   
to  
reduce the   
radiation dose to   
as low as   
reasonably   
achievable.; CT of   
the  
chest, abdomen and   
pelvis was   
performed with the   
administration of  
intravenous   
contrast.   
Multiplanar   
reformatted images   
are provided for   
review.  
Automated exposure   
control, iterative   
reconstruction,   
and/or weight based  
adjustment of the   
mA/kV was utilized   
to reduce the   
radiation dose to   
as low  
as reasonably   
achievable.  
COMPARISON:  
None.  
HISTORY:  
ORDERING SYSTEM   
PROVIDED HISTORY:   
fall down steps  
TECHNOLOGIST   
PROVIDED HISTORY:  
fall down steps  
Decision Support   
Exception -   
unselect if not a   
suspected or   
confirmed  
emergency medical   
condition->Emergenc  
y Medical Condition   
(MA)  
Is the patient   
pregnant?->No  
FINDINGS:  
Head CT:  
There is no acute   
infarction,   
intracranial   
hemorrhage or   
intracranial mass  
lesion. No mass   
effect, midline   
shift or   
extra-axial   
collection is   
noted.  
The brain   
parenchyma is   
normal. The   
cerebellar tonsils   
are in normal   
position.  
The ventricles,   
sulci, and cisterns   
are within normal   
limits in size and  
configuration.  
The globes and   
orbits are within   
normal limits. The   
visualized   
extracranial  
structures   
including paranasal   
sinuses and mastoid   
air cells are  
unremarkable. No   
fracture is   
identified. Central   
frontal   
subcutaneous  
hematoma.  
Cervical:  
BONES/ALIGNMENT:   
There is no acute   
fracture or   
traumatic   
malalignment.  
DEGENERATIVE   
CHANGES: Multilevel   
disc and facet   
degenerative   
disease most  
pronounced at C4-C5   
and C6-C7. At   
C4-C5, posterior   
osteophytic ridging   
and  
central disc   
protrusion.  
SOFT TISSUES: There   
is no prevertebral   
soft tissue   
swelling.  
CT chest:  
Lines and tubes:   
None.  
Lungs and Airways   
and Pleura: Central   
airways are patent.   
Linear  
ground-glass   
densities at the   
bases, likely   
suggestive of   
atelectasis. No  
lung consolidation.   
No pleural   
effusion. No   
pneumothorax.  
Lymph nodes: No   
pathologically   
enlarged   
mediastinal, hilar,   
lower cervical,  
or chest wall lymph   
nodes.  
Cardiovascular and   
Mediastinum: No   
acute aortic   
pathology. Cardiac   
chamber  
sizes appear to   
measure within   
normal limits on   
this non ECG gated   
study. No  
pericardial   
effusion. The   
thyroid gland is   
unremarkable. The   
esophagus is  
unremarkable.  
Bones/Soft tissues:   
No fracture. No   
definite suspicious   
lytic or blastic  
foci.  
CT abdomen and   
pelvis:  
Organs: 3.1 cm   
fibroid is arising   
from uterus. 6.5 x   
5.3 cm relatively   
dense  
right adnexal cyst.   
A status post   
cholecystectomy.   
The liver, spleen,  
adrenal glands,   
pancreas, kidneys   
and ureters and   
pelvic organs   
including  
the urinary bladder   
appear   
unremarkable.  
Peritoneum /   
Retroperitoneum: No   
free air or free   
fluid is noted. No  
pathologically   
enlarged   
lymphadenopathy.   
The vasculature do   
not demonstrate  
acute abnormality.   
Multiple coiling   
materials are noted   
along the course  
left gonadal vein.  
GI Tract: No   
distention or wall   
thickening.  
Bones and Soft   
Tissues: No   
fracture. No   
concerning lytic or   
sclerotic  
lesions are noted.  
IMPRESSION:  
Head CT: No acute   
intracranial   
abnormality. No   
evidence for acute  
intracranial   
hemorrhage,   
territorial   
infarction or   
intracranial mass   
lesion.  
Central frontal   
subcutaneous   
hematoma.  
Cervical CT: No   
acute abnormality   
of the cervical   
spine. No fracture.  
CT of the chest   
abdomen and pelvis:   
No acute traumatic   
injury within the  
chest abdomen and   
pelvis.  
Linear ground-glass   
densities at the   
bases likely   
suggestive of   
atelectasis.  
Fibroid uterus.  
6.5 cm relatively   
dense right adnexal   
cyst. Differential   
possibilities  
include   
endometrioma and   
hemorrhagic cyst.   
For further   
evaluation pelvic  
ultrasound   
examination is   
recommended.  
Interpreted by:  
Yanci Salgado MD  
Signed by:  
Yanci Salgado MD  
10/28/22  
Final result        Normal                                  [facility]  
   
                                                    CT HEAD WO CONTRASTon 10-29-  
2022   
   
                                        CT HEAD WO CONTRAST EXAMINATION:  
CT OF THE HEAD   
WITHOUT CONTRAST;   
CT OF THE CERVICAL   
SPINE WITHOUT   
CONTRAST;  
CT OF THE CHEST,   
ABDOMEN, AND PELVIS   
WITH CONTRAST   
10/28/2022 9:28 pm  
TECHNIQUE:  
CT of the head was   
performed without   
the administration   
of intravenous  
contrast. Automated   
exposure control,   
iterative   
reconstruction,   
and/or weight  
based adjustment of   
the mA/kV was   
utilized to reduce   
the radiation dose   
to as  
low as reasonably   
achievable.; CT of   
the cervical spine   
was performed   
without  
the administration   
of intravenous   
contrast.   
Multiplanar   
reformatted images  
are provided for   
review. Automated   
exposure control,   
iterative  
reconstruction,   
and/or weight based   
adjustment of the   
mA/kV was utilized   
to  
reduce the   
radiation dose to   
as low as   
reasonably   
achievable.; CT of   
the  
chest, abdomen and   
pelvis was   
performed with the   
administration of  
intravenous   
contrast.   
Multiplanar   
reformatted images   
are provided for   
review.  
Automated exposure   
control, iterative   
reconstruction,   
and/or weight based  
adjustment of the   
mA/kV was utilized   
to reduce the   
radiation dose to   
as low  
as reasonably   
achievable.  
COMPARISON:  
None.  
HISTORY:  
ORDERING SYSTEM   
PROVIDED HISTORY:   
fall down steps  
TECHNOLOGIST   
PROVIDED HISTORY:  
fall down steps  
Decision Support   
Exception -   
unselect if not a   
suspected or   
confirmed  
emergency medical   
condition->Emergenc  
y Medical Condition   
(MA)  
Is the patient   
pregnant?->No  
FINDINGS:  
Head CT:  
There is no acute   
infarction,   
intracranial   
hemorrhage or   
intracranial mass  
lesion. No mass   
effect, midline   
shift or   
extra-axial   
collection is   
noted.  
The brain   
parenchyma is   
normal. The   
cerebellar tonsils   
are in normal   
position.  
The ventricles,   
sulci, and cisterns   
are within normal   
limits in size and  
configuration.  
The globes and   
orbits are within   
normal limits. The   
visualized   
extracranial  
structures   
including paranasal   
sinuses and mastoid   
air cells are  
unremarkable. No   
fracture is   
identified. Central   
frontal   
subcutaneous  
hematoma.  
Cervical:  
BONES/ALIGNMENT:   
There is no acute   
fracture or   
traumatic   
malalignment.  
DEGENERATIVE   
CHANGES: Multilevel   
disc and facet   
degenerative   
disease most  
pronounced at C4-C5   
and C6-C7. At   
C4-C5, posterior   
osteophytic ridging   
and  
central disc   
protrusion.  
SOFT TISSUES: There   
is no prevertebral   
soft tissue   
swelling.  
CT chest:  
Lines and tubes:   
None.  
Lungs and Airways   
and Pleura: Central   
airways are patent.   
Linear  
ground-glass   
densities at the   
bases, likely   
suggestive of   
atelectasis. No  
lung consolidation.   
No pleural   
effusion. No   
pneumothorax.  
Lymph nodes: No   
pathologically   
enlarged   
mediastinal, hilar,   
lower cervical,  
or chest wall lymph   
nodes.  
Cardiovascular and   
Mediastinum: No   
acute aortic   
pathology. Cardiac   
chamber  
sizes appear to   
measure within   
normal limits on   
this non ECG gated   
study. No  
pericardial   
effusion. The   
thyroid gland is   
unremarkable. The   
esophagus is  
unremarkable.  
Bones/Soft tissues:   
No fracture. No   
definite suspicious   
lytic or blastic  
foci.  
CT abdomen and   
pelvis:  
Organs: 3.1 cm   
fibroid is arising   
from uterus. 6.5 x   
5.3 cm relatively   
dense  
right adnexal cyst.   
A status post   
cholecystectomy.   
The liver, spleen,  
adrenal glands,   
pancreas, kidneys   
and ureters and   
pelvic organs   
including  
the urinary bladder   
appear   
unremarkable.  
Peritoneum /   
Retroperitoneum: No   
free air or free   
fluid is noted. No  
pathologically   
enlarged   
lymphadenopathy.   
The vasculature do   
not demonstrate  
acute abnormality.   
Multiple coiling   
materials are noted   
along the course  
left gonadal vein.  
GI Tract: No   
distention or wall   
thickening.  
Bones and Soft   
Tissues: No   
fracture. No   
concerning lytic or   
sclerotic  
lesions are noted.  
IMPRESSION:  
Head CT: No acute   
intracranial   
abnormality. No   
evidence for acute  
intracranial   
hemorrhage,   
territorial   
infarction or   
intracranial mass   
lesion.  
Central frontal   
subcutaneous   
hematoma.  
Cervical CT: No   
acute abnormality   
of the cervical   
spine. No fracture.  
CT of the chest   
abdomen and pelvis:   
No acute traumatic   
injury within the  
chest abdomen and   
pelvis.  
Linear ground-glass   
densities at the   
bases likely   
suggestive of   
atelectasis.  
Fibroid uterus.  
6.5 cm relatively   
dense right adnexal   
cyst. Differential   
possibilities  
include   
endometrioma and   
hemorrhagic cyst.   
For further   
evaluation pelvic  
ultrasound   
examination is   
recommended.  
Interpreted by:  
Yanci Salgado MD  
Signed by:  
Yanci Salgado MD  
10/28/22  
Final result        Normal                                  [facility]  
   
                                                    Basic Metabolic Panelon 10-2  
   
   
                      Anion gap [Moles/Vol] 11 mmol/L             9 - 17 mmol/L   
[facility]  
   
                          Calcium [Mass/Vol] 8.9 mg/dL                 8.6 - 10.  
4   
mg/dL                                   [facility]  
   
                          Chloride [Moles/Vol] 107 mmol/L                98 - 10  
7   
mmol/L                                  [facility]  
   
                          CO2 [Moles/Vol] 24 mmol/L                 20 - 31   
mmol/L                                  [facility]  
   
                          Creatinine [Mass/Vol] 0.71 mg/dL                0.50 -  
 0.90   
mg/dL                                   [facility]  
   
                                                    GFR/1.73 sq   
M.predicted MDRD   
(S/P/Bld) [Vol   
rate/Area]                                      - PINF          [facility]  
   
                                        Comment on above:     
Effective Oct 3, 2022  
  
These results are not intended for use in patients <18 years of   
age.  
  
eGFR results are calculated without a race factor using the   
 CKD-EPI equation.  
Careful clinical correlation is recommended, particularly when   
comparing to results  
calculated using previous equations.  
The CKD-EPI equation is less accurate in patients with extremes   
of muscle mass, extra-renal  
metabolism of creatine, excessive creatine ingestion, or   
following therapy that affects  
renal tubular secretion.  
  
   
   
                      Glucose [Mass/Vol] 117 mg/dL  High       70 - 99 mg/dL [facility]  
   
                                                    Interpretation and   
review of laboratory   
results         Abnormal                                        [facility]  
   
                          Potassium [Moles/Vol] 4.0 mmol/L                3.7 -   
5.3   
mmol/L                                  [facility]  
   
                          Sodium [Moles/Vol] 142 mmol/L                135 - 144  
   
mmol/L                                  [facility]  
   
                                                    Urea nitrogen (BldV)   
[Mass/Vol]      18 mg/dL                        6 - 20 mg/dL    [facility]  
   
                                                    Urea   
nitrogen/Creatinine   
(Bld) [Mass ratio] 25              High            9 - 20          [facility]  
   
                                                    Basic Metabolic Profon 10-28  
-2022   
   
                      Anion gap [Moles/Vol] 11 mmol/L  Normal     9-17       [facility]  
   
                                        Comment on above:   Performed By: #### B  
JACLYN, CBC ####  
[facility]  
[address] [place] [address] [place] [address] [phone]  
: Joey Low MD   
   
                      BUN/CRE Ratio 25         High       9-20       [facility]  
   
                                        Comment on above:   Performed By: #### B  
JACLYN, CBC ####  
[facility]  
[address] [place] [address] [place] [address] [phone]  
: Joey Low MD   
   
                      Calcium [Mass/Vol] 8.9 mg/dL  Normal     8.6-10.4   [facility]  
   
                                        Comment on above:   Performed By: #### B  
JACLYN, CBC ####  
[facility]  
[address] [place] [address] [phone]  
: Joey Low MD   
   
                      Chloride [Moles/Vol] 107 mmol/L Normal          [facility]  
   
                                        Comment on above:   Performed By: #### B  
MP, CBC ####  
[facility]  
[address] [place] [address] [phone]  
: Joey Low MD   
   
                      CO2 [Moles/Vol] 24 mmol/L  Normal     20-31      [facility]  
   
                                        Comment on above:   Performed By: #### B  
MP, CBC ####  
[facility]  
[address] [place] [address] [phone]  
: Joey Low MD   
   
                      Creatinine [Mass/Vol] 0.71 mg/dL Normal     0.50-0.90  [facility]  
   
                                        Comment on above:   Performed By: #### B  
MP, CBC ####  
[facility]  
[address] [place] [address] [phone]  
: Joey Low MD   
   
                                                    GFR/1.73 sq   
M.predicted among   
non-blacks MDRD   
(S/P/Bld) [Vol   
rate/Area]      mL/min/{1.73_m2} Normal          >60             [facility]  
   
                                        Comment on above:   Result Comment:  
Effective Oct 3, 2022  
These results are not intended for use in patients <18 years of   
age.  
eGFR results are calculated without a race factor using the   
 CKD-EPI  
equation.  
Careful clinical correlation is recommended, particularly when   
comparing to  
results calculated using previous equations.  
The CKD-EPI equation is less accurate in patients with extremes   
of muscle mass,  
extra-renal metabolism of creatine, excessive creatine   
ingestion, or following  
therapy that affects renal tubular secretion.   
   
                                                            Performed By: #### B  
JACLYN, CBC ####  
[address] [place] [address] [phone]  
: Joey Low MD   
   
                      Glucose [Mass/Vol] 117 mg/dL  High       70-99      [facility]  
   
                                        Comment on above:   Performed By: #### B  
JACLYN, CBC ####  
[facility]  
[address] [place] [address] [phone]  
: Joey Low MD   
   
                      Potassium [Moles/Vol] 4.0 mmol/L Normal     3.7-5.3    [facility]  
   
                                        Comment on above:   Performed By: #### B  
JACLYN, CBC ####  
[address] [place] [address] [phone]  
: Joey Low MD   
   
                      Sodium [Moles/Vol] 142 mmol/L Normal     135-144    [facility]  
   
                                        Comment on above:   Performed By: #### B  
MP, CBC ####  
[address] [place] [address] [phone]  
: Joey Low MD   
   
                                                    Urea nitrogen   
[Mass/Vol]      18 mg/dL        Normal          6-20            [facility]  
   
                                        Comment on above:   Performed By: #### B  
MP, CBC ####  
[address] [place] [address] [phone]  
: Joey Low MD   
   
                                                    CBCon 10-   
   
                                                    Erythrocyte   
distribution width   
(RBC) [Ratio]   12.6 %          Normal          11.8-14.4       [facility]  
   
                                        Comment on above:   Performed By: #### B  
MP, CBC ####  
[address] [place] [address] [phone]  
: Joey Low MD   
   
                                                    Hematocrit (Bld)   
[Volume fraction] 41.5 %          Normal          36.3-47.1       [facility]  
   
                                        Comment on above:   Performed By: #### B  
MP, CBC ####  
[address] [place] [address] [phone]  
: Joey Low MD   
   
                                                    Hemoglobin (Bld)   
[Mass/Vol]      14.1 g/dL       Normal          11.9-15.1       [facility]  
   
                                        Comment on above:   Performed By: #### B  
MP, CBC ####  
[address] [place] [address] [phone]  
: Joey Low MD   
   
                                                    MCH (RBC) [Entitic   
mass]           32.0 pg         Normal          25.2-33.5       [facility]  
   
                                        Comment on above:   Performed By: #### B  
MP, CBC ####  
[address] [place] [address] [phone]  
: Joey Low MD   
   
                      MCHC (RBC) [Mass/Vol] 34.0 g/dL  Normal     28.4-34.8  [facility]  
   
                                        Comment on above:   Performed By: #### B  
MP, CBC ####  
[facility]  
[address] [place], [place] [id number] [phone]  
: Joey Low MD   
   
                                                    MCV (RBC) [Entitic   
vol]            94.3 fL         Normal          82.6-102.9      [facility]  
   
                                        Comment on above:   Performed By: #### B  
MP, CBC ####  
[address] [place] [address] [phone]  
: Joey Low MD   
   
                      NRBC Automated 0.0 per 100 WBC Normal     0.0        [facility]  
   
                                        Comment on above:   Performed By: #### B  
MP, CBC ####  
[address] [place], [place] [id number] [phone]  
: Joey Low MD   
   
                                                    Platelet mean volume   
(Bld) [Entitic vol] 10.4 fL         Normal          8.1-13.5        [facility]  
   
                                        Comment on above:   Performed By: #### B  
MP, CBC ####  
[address] [place], [place] [id number] [phone]  
: Joey Low MD   
   
                                                    Platelets (Bld)   
[#/Vol]         173 10*3/uL     Normal          138-453         [facility]  
   
                                        Comment on above:   Performed By: #### B  
MP, CBC ####  
[address] [place] [address] [phone]  
: Joey Low MD   
   
                      RBC (Bld) [#/Vol] 4.40 10*6/uL Normal     3.95-5.11  [facility]  
   
                                        Comment on above:   Performed By: #### B  
MP, CBC ####  
[address] [place] [address] [phone]  
: Joey Low MD   
   
                      WBC (Bld) [#/Vol] 5.6 10*3/uL Normal     3.5-11.3   [facility]  
   
                                        Comment on above:   Performed By: #### B  
MP, CBC ####  
[address] [place] [address] [phone]  
: Joey Low MD   
   
                                                    Hematocrit (Bld)   
[Volume fraction] 41.5 %                          36.3 - 47.1 %   [facility]  
   
                                                    Hemoglobin (Bld)   
[Mass/Vol]          14.1 g/dL                               11.9 - 15.1   
g/dL                                    [facility]  
   
                                                    MCH (RBC) [Entitic   
mass]               32.0 pg                                 25.2 - 33.5   
pg                                      [facility]  
   
                          MCHC (RBC) [Mass/Vol] 34.0 g/dL                 28.4 -  
 34.8   
g/dL                                    [facility]  
   
                                                    MCV (RBC) [Entitic   
vol]                94.3 fL                                 82.6 - 102.9   
fL                                      [facility]  
   
                          NRBC Automated 0.0                       0.0 per 100   
WBC                                     [facility]  
   
                                                    Platelet distribution   
width (Bld) [Ratio] 12.6 %                          11.8 - 14.4 %   [facility]  
   
                                                    Platelet mean volume   
(Bld) [Entitic vol] 10.4 fL                         8.1 - 13.5 fL   [facility]  
   
                                                    Platelets (Bld)   
[#/Vol]         173 10*3/uL                                     [facility]  
   
                          RBC (Bld) [#/Vol] 4.40 10*6/uL              3.95 - 5.1  
1   
m/uL                                    [facility]  
   
                      WBC (Bld) [#/Vol] 5.6 10*3/uL                       [facility]  
   
                                                    CT CERVICAL SPINE WO CONTRAS  
Ton 10-   
   
                                                    Radiology Study   
observation   
(narrative)                                                     [facility]  
Work Phone:   
[phone]  
   
                                                    CT CHEST ABDOMEN PELVIS W CO  
NTRAST Additional Contrast? Noneon 10-   
   
                                                    Radiology Study   
observation   
(narrative)                                                     [facility]  
Work Phone:   
[phone]  
   
                                                    CT Head WO Contraston 10-28-  
2022   
   
                                                    Radiology Study   
observation   
(narrative)                                                     [facility]  
Work Phone:   
[phone]  
   
                                                    Ethanolon 10-   
   
                          Ethanol [Mass/Vol] 233 mg/dL    High         NINF - 10  
   
mg/dL                                   [facility]  
   
                          Ethanol percent 0.233 %      High         NINF - 0.010  
   
%                                       [facility]  
   
                                                    Interpretation and   
review of laboratory   
results         Abnormal                                        [facility]  
   
                                                    Ethanol Alcoholon 10-  
   
   
                      Ethanol [Mass/Vol] 233 mg/dL  High       <10        [facility]  
   
                                        Comment on above:   Performed By: #### A  
LCB ####  
[facility]  
[address] [place] [address] [phone]  
: Joey Low MD   
   
                      Ethanol percent 0.233 %    High       <0.010     [facility]  
   
                                        Comment on above:   Performed By: #### A  
LCB ####  
[facility]  
[address] [place] [address] [phone]  
: Joey Low MD   
   
                                                    No Panel Informationon 10-28  
-2022   
   
                                                            Head CT: No acute   
intracranial   
abnormality. No   
evidence for acute  
intracranial   
hemorrhage,   
territorial   
infarction or   
intracranial mass   
lesion.  
Central frontal   
subcutaneous   
hematoma.  
  
Cervical CT: No   
acute abnormality   
of the cervical   
spine. No fracture.  
  
CT of the chest   
abdomen and pelvis:   
No acute traumatic   
injury within the  
chest abdomen and   
pelvis.  
  
Linear ground-glass   
densities at the   
bases likely   
suggestive of   
atelectasis.  
  
Fibroid uterus.  
  
6.5 cm relatively   
dense right adnexal   
cyst. Differential   
possibilities  
include   
endometrioma and   
hemorrhagic cyst.   
For further   
evaluation pelvic  
ultrasound   
examination is   
recommended.  
                                                            [facility]   
CONSOLIDATED  
   
                                                            EXAMINATION:  
CT OF THE HEAD   
WITHOUT CONTRAST;   
CT OF THE CERVICAL   
SPINE WITHOUT   
CONTRAST;  
CT OF THE CHEST,   
ABDOMEN, AND PELVIS   
WITH CONTRAST   
10/28/2022 9:28 pm  
  
TECHNIQUE:  
CT of the head was   
performed without   
the administration   
of intravenous  
contrast. Automated   
exposure control,   
iterative   
reconstruction,   
and/or weight  
based adjustment of   
the mA/kV was   
utilized to reduce   
the radiation dose   
to as  
low as reasonably   
achievable.; CT of   
the cervical spine   
was performed   
without  
the administration   
of intravenous   
contrast.   
Multiplanar   
reformatted images  
are provided for   
review. Automated   
exposure control,   
iterative  
reconstruction,   
and/or weight based   
adjustment of the   
mA/kV was utilized   
to  
reduce the   
radiation dose to   
as low as   
reasonably   
achievable.; CT of   
the  
chest, abdomen and   
pelvis was   
performed with the   
administration of  
intravenous   
contrast.   
Multiplanar   
reformatted images   
are provided for   
review.  
Automated exposure   
control, iterative   
reconstruction,   
and/or weight based  
adjustment of the   
mA/kV was utilized   
to reduce the   
radiation dose to   
as low  
as reasonably   
achievable.  
  
COMPARISON:  
None.  
  
HISTORY:  
ORDERING SYSTEM   
PROVIDED HISTORY:   
fall down steps  
TECHNOLOGIST   
PROVIDED HISTORY:  
fall down steps  
  
Decision Support   
Exception -   
unselect if not a   
suspected or   
confirmed  
emergency medical   
condition->Emergenc  
y Medical Condition   
(MA)  
Is the patient   
pregnant?->No  
  
FINDINGS:  
Head CT:  
  
There is no acute   
infarction,   
intracranial   
hemorrhage or   
intracranial mass  
lesion. No mass   
effect, midline   
shift or   
extra-axial   
collection is   
noted.  
  
The brain   
parenchyma is   
normal. The   
cerebellar tonsils   
are in normal   
position.  
  
The ventricles,   
sulci, and cisterns   
are within normal   
limits in size and  
configuration.  
  
The globes and   
orbits are within   
normal limits. The   
visualized   
extracranial  
structures   
including paranasal   
sinuses and mastoid   
air cells are  
unremarkable. No   
fracture is   
identified. Central   
frontal   
subcutaneous  
hematoma.  
  
Cervical:  
  
BONES/ALIGNMENT:   
There is no acute   
fracture or   
traumatic   
malalignment.  
  
DEGENERATIVE   
CHANGES: Multilevel   
disc and facet   
degenerative   
disease most  
pronounced at C4-C5   
and C6-C7. At   
C4-C5, posterior   
osteophytic ridging   
and  
central disc   
protrusion.  
  
SOFT TISSUES: There   
is no prevertebral   
soft tissue   
swelling.  
  
CT chest:  
  
Lines and tubes:   
None.  
  
Lungs and Airways   
and Pleura: Central   
airways are patent.   
Linear  
ground-glass   
densities at the   
bases, likely   
suggestive of   
atelectasis. No  
lung consolidation.   
No pleural   
effusion. No   
pneumothorax.  
  
Lymph nodes: No   
pathologically   
enlarged   
mediastinal, hilar,   
lower cervical,  
or chest wall lymph   
nodes.  
  
Cardiovascular and   
Mediastinum: No   
acute aortic   
pathology. Cardiac   
chamber  
sizes appear to   
measure within   
normal limits on   
this non ECG gated   
study. No  
pericardial   
effusion. The   
thyroid gland is   
unremarkable. The   
esophagus is  
unremarkable.  
  
Bones/Soft tissues:   
No fracture. No   
definite suspicious   
lytic or blastic  
foci.  
  
CT abdomen and   
pelvis:  
  
Organs: 3.1 cm   
fibroid is arising   
from uterus. 6.5 x   
5.3 cm relatively   
dense  
right adnexal cyst.   
A status post   
cholecystectomy.   
The liver, spleen,  
adrenal glands,   
pancreas, kidneys   
and ureters and   
pelvic organs   
including  
the urinary bladder   
appear   
unremarkable.  
  
Peritoneum /   
Retroperitoneum: No   
free air or free   
fluid is noted. No  
pathologically   
enlarged   
lymphadenopathy.   
The vasculature do   
not demonstrate  
acute abnormality.   
Multiple coiling   
materials are noted   
along the course  
left gonadal vein.  
  
  
GI Tract: No   
distention or wall   
thickening.  
  
Bones and Soft   
Tissues: No   
fracture. No   
concerning lytic or   
sclerotic  
lesions are noted.  
                                                            [facility]   
CONSOLIDATED  
   
                                                            Yanci Salgado MD -   
10/28/2022   
Formatting of this   
note might be   
different from the   
original.  
EXAMINATION:  
CT OF THE HEAD   
WITHOUT CONTRAST;   
CT OF THE CERVICAL   
SPINE WITHOUT   
CONTRAST;  
CT OF THE CHEST,   
ABDOMEN, AND PELVIS   
WITH CONTRAST   
10/28/2022 9:28 pm  
  
TECHNIQUE:  
CT of the head was   
performed without   
the administration   
of intravenous  
contrast. Automated   
exposure control,   
iterative   
reconstruction,   
and/or weight  
based adjustment of   
the mA/kV was   
utilized to reduce   
the radiation dose   
to as  
low as reasonably   
achievable.; CT of   
the cervical spine   
was performed   
without  
the administration   
of intravenous   
contrast.   
Multiplanar   
reformatted images  
are provided for   
review. Automated   
exposure control,   
iterative  
reconstruction,   
and/or weight based   
adjustment of the   
mA/kV was utilized   
to  
reduce the   
radiation dose to   
as low as   
reasonably   
achievable.; CT of   
the  
chest, abdomen and   
pelvis was   
performed with the   
administration of  
intravenous   
contrast.   
Multiplanar   
reformatted images   
are provided for   
review.  
Automated exposure   
control, iterative   
reconstruction,   
and/or weight based  
adjustment of the   
mA/kV was utilized   
to reduce the   
radiation dose to   
as low  
as reasonably   
achievable.  
  
COMPARISON:  
None.  
  
HISTORY:  
ORDERING SYSTEM   
PROVIDED HISTORY:   
fall down steps  
TECHNOLOGIST   
PROVIDED HISTORY:  
fall down steps  
  
Decision Support   
Exception -   
unselect if not a   
suspected or   
confirmed  
emergency medical   
condition->Emergenc  
y Medical Condition   
(MA)  
Is the patient   
pregnant?->No  
  
FINDINGS:  
Head CT:  
  
There is no acute   
infarction,   
intracranial   
hemorrhage or   
intracranial mass  
lesion. No mass   
effect, midline   
shift or   
extra-axial   
collection is   
noted.  
  
The brain   
parenchyma is   
normal. The   
cerebellar tonsils   
are in normal   
position.  
  
The ventricles,   
sulci, and cisterns   
are within normal   
limits in size and  
configuration.  
  
The globes and   
orbits are within   
normal limits. The   
visualized   
extracranial  
structures   
including paranasal   
sinuses and mastoid   
air cells are  
unremarkable. No   
fracture is   
identified. Central   
frontal   
subcutaneous  
hematoma.  
  
Cervical:  
  
BONES/ALIGNMENT:   
There is no acute   
fracture or   
traumatic   
malalignment.  
  
DEGENERATIVE   
CHANGES: Multilevel   
disc and facet   
degenerative   
disease most  
pronounced at C4-C5   
and C6-C7. At   
C4-C5, posterior   
osteophytic ridging   
and  
central disc   
protrusion.  
  
SOFT TISSUES: There   
is no prevertebral   
soft tissue   
swelling.  
  
CT chest:  
  
Lines and tubes:   
None.  
  
Lungs and Airways   
and Pleura: Central   
airways are patent.   
Linear  
ground-glass   
densities at the   
bases, likely   
suggestive of   
atelectasis. No  
lung consolidation.   
No pleural   
effusion. No   
pneumothorax.  
  
Lymph nodes: No   
pathologically   
enlarged   
mediastinal, hilar,   
lower cervical,  
or chest wall lymph   
nodes.  
  
Cardiovascular and   
Mediastinum: No   
acute aortic   
pathology. Cardiac   
chamber  
sizes appear to   
measure within   
normal limits on   
this non ECG gated   
study. No  
pericardial   
effusion. The   
thyroid gland is   
unremarkable. The   
esophagus is  
unremarkable.  
  
Bones/Soft tissues:   
No fracture. No   
definite suspicious   
lytic or blastic  
foci.  
  
CT abdomen and   
pelvis:  
  
Organs: 3.1 cm   
fibroid is arising   
from uterus. 6.5 x   
5.3 cm relatively   
dense  
right adnexal cyst.   
A status post   
cholecystectomy.   
The liver, spleen,  
adrenal glands,   
pancreas, kidneys   
and ureters and   
pelvic organs   
including  
the urinary bladder   
appear   
unremarkable.  
  
Peritoneum /   
Retroperitoneum: No   
free air or free   
fluid is noted. No  
pathologically   
enlarged   
lymphadenopathy.   
The vasculature do   
not demonstrate  
acute abnormality.   
Multiple coiling   
materials are noted   
along the course  
left gonadal vein.  
  
  
GI Tract: No   
distention or wall   
thickening.  
  
Bones and Soft   
Tissues: No   
fracture. No   
concerning lytic or   
sclerotic  
lesions are noted.  
  
IMPRESSION:  
Head CT: No acute   
intracranial   
abnormality. No   
evidence for acute  
intracranial   
hemorrhage,   
territorial   
infarction or   
intracranial mass   
lesion.  
Central frontal   
subcutaneous   
hematoma.  
  
Cervical CT: No   
acute abnormality   
of the cervical   
spine. No fracture.  
  
CT of the chest   
abdomen and pelvis:   
No acute traumatic   
injury within the  
chest abdomen and   
pelvis.  
  
Linear ground-glass   
densities at the   
bases likely   
suggestive of   
atelectasis.  
  
Fibroid uterus.  
  
6.5 cm relatively   
dense right adnexal   
cyst. Differential   
possibilities  
include   
endometrioma and   
hemorrhagic cyst.   
For further   
evaluation pelvic  
ultrasound   
examination is   
recommended.  
  
                                                            [facility]  
Work Phone:   
[phone]  
   
                                                    No Panel InformationOrdered   
By: Yanci Salgado on 10-   
   
                                                                  [facility] [employer]  
Work Phone:   
[phone]  
   
                                                    CBC AUTO DIFFon 2022   
   
                      BASO #     0.0 103/ul Normal     0.0-0.1    [facility]  
   
                                        Comment on above:   Performed By: #### C  
BC ####  
[facility]  
[address] [place] [id number]  
Dr. Ledy Howell   
   
                                                    Basophils/100 WBC   
(Bld)           1.0 %           Normal          0.2-2.0         [facility]  
   
                                        Comment on above:   Performed By: #### C  
BC ####  
[facility]  
[address] [place] [address]  
Dr. Ledy Howell   
   
                      EO #       0.2 103/ul Normal     0.0-0.7    [facility]  
   
                                        Comment on above:   Performed By: #### C  
BC ####  
[facility]  
[address] [place] [address]  
Dr. Ledy Howell   
   
                                                    Eosinophils/100 WBC   
(Bld)           3.9 %           Normal          0.9-7.0         [facility]  
   
                                        Comment on above:   Performed By: #### C  
BC ####  
[facility]  
[address] [place] [address]  
Dr. Ledy Howell   
   
                                                    Erythrocyte   
distribution width   
(RBC) [Ratio]   12.4 %          Normal          11.0-15.0       [facility]  
   
                                        Comment on above:   Performed By: #### C  
BC ####  
[facility]  
[address] [place] [address]  
Dr. Ledy Howell   
   
                                                    Hematocrit (Bld)   
[Volume fraction] 41.5 %          Normal          36.0-48.0       [facility]  
   
                                        Comment on above:   Performed By: #### C  
BC ####  
[facility]  
[address] [place] [address]  
Dr. Ledy Howell   
   
                                                    Hemoglobin (Bld)   
[Mass/Vol]      13.9 g/dL       Normal          12.0-16.0       [facility]  
   
                                        Comment on above:   Performed By: #### C  
BC ####  
[facility]  
[address] [place] [address]  
Dr. Ledy Howell   
   
                      IG #       0.01 10e3/ul Normal     0.00-0.03  [facility]  
   
                                        Comment on above:   Performed By: #### C  
BC ####  
[facility]  
[address] [place] [address]  
Dr. Ledy Howell   
   
                      IG %       0.2 %      Normal     0.0-0.5    [facility]  
   
                                        Comment on above:   Performed By: #### C  
BC ####  
[facility]  
[address] [place] [address]  
Dr. Ledy Howell   
   
                      LYMPH #    1.5 103/ul Normal     1.2-3.8    [facility]  
   
                                        Comment on above:   Performed By: #### C  
BC ####  
[facility]  
[address] [place] [address]  
Dr. Ledy Howell   
   
                                                    Lymphocytes/100 WBC   
(Bld)           36.6 %          Normal          20.5-60.0       [facility]  
   
                                        Comment on above:   Performed By: #### C  
BC ####  
[facility]  
[address] [place] [address]  
Dr. Ledy Howell   
   
                      MANUAL DIFF REQ NO         Normal                [facility]  
   
                                        Comment on above:   Performed By: #### C  
BC ####  
[facility]  
[address] [place] [address]  
Dr. Ledy Howell   
   
                                                    MCH (RBC) [Entitic   
mass]           31.2 pg         Normal          26.7-34.0       [facility]  
   
                                        Comment on above:   Performed By: #### C  
BC ####  
[facility]  
[address] [place] [address]  
Dr. Ledy Howell   
   
                      MCHC (RBC) [Mass/Vol] 33.5 g/dL  Normal     29.9-35.2  [facility]  
   
                                        Comment on above:   Performed By: #### C  
BC ####  
[facility]  
[address] [place] [address]  
Dr. Ledy Howell   
   
                                                    MCV (RBC) [Entitic   
vol]            93.0 fL         Normal          81.0-99.0       [facility]  
   
                                        Comment on above:   Performed By: #### C  
BC ####  
[facility]  
[address] [place] [address]  
Dr. Ledy Howell   
   
                      MONO #     0.4 103/ul Normal     0.3-0.8    [facility]  
   
                                        Comment on above:   Performed By: #### C  
BC ####  
[facility]  
[address] [place] [address]  
Dr. Ledy Howell   
   
                                                    Monocytes/100 WBC   
(Bld)           9.0 %           Normal          1.7-12.0        [facility]  
   
                                        Comment on above:   Performed By: #### C  
BC ####  
[facility]  
[address] [place] [address]  
Dr. Ledy Howell   
   
                      NEUT #     2.0 103/ul Normal     1.4-6.5    [facility]  
   
                                        Comment on above:   Performed By: #### C  
BC ####  
[facility]  
[address] [place] [address]  
Dr. Ledy Howell   
   
                                                    Neutrophils/100 WBC   
(Bld)           49.3 %          Normal          43.0-75.0       [facility]  
   
                                        Comment on above:   Performed By: #### C  
BC ####  
[facility]  
[address] [address]  
Dr. Ledy Howell   
   
                                                    Platelet mean volume   
(Bld) [Entitic vol] 10.5 fL         Normal          9.5-13.5        [facility]  
   
                                        Comment on above:   Performed By: #### C  
BC ####  
[facility]  
[address] [address]  
Dr. Ledy Howell   
   
                      PLT        202 103/ul Normal     150-450    [facility]  
   
                                        Comment on above:   Performed By: #### C  
BC ####  
[facility]  
[address] [place] [address]  
Dr. Ledy Howell   
   
                      RBC        4.46 106/ul Normal     4.20-5.40  [facility]  
   
                                        Comment on above:   Performed By: #### C  
BC ####  
[facility]  
[address] [place] [address]  
Dr. Ledy Howell   
   
                      WBC        4.1 103/ul Normal     4.0-11.0   [facility]  
   
                                        Comment on above:   Performed By: #### C  
BC ####  
[facility]  
[address] [place] [address]  
Dr. Ledy Howell   
   
                                                    FREE THYROXINE INDEX T7on    
   
                      FTI        2.28       Normal                [facility]  
   
                                        Comment on above:   Performed By: #### L  
IPID, CMP, T7, TSH ####  
[facility]  
[address] [place] [address]  
Dr. Ledy Howell   
   
                      T3U        35.0 %     Normal     23.5-40.5  [facility]  
   
                                        Comment on above:   Performed By: #### L  
IPID, CMP, T7, TSH ####  
[facility]  
[address] [address]  
Dr. Ledy Howell   
   
                      T4 [Mass/Vol] 6.50 ug/dL Normal     5.53-11.00 [facility]  
   
                                        Comment on above:   Performed By: #### L  
IPID, CMP, T7, TSH ####  
[facility]  
[address] [address]  
Dr. Ledy Howell   
   
                                                    GLYCOHEMOGLOBIN A1Con 2022   
   
                                        ADA RECOMMENDATION  ADA THERAPEUTIC   
TARGET 6.0 - 7.0   
ACTION SUGGESTED >   
7.0                 Normal                                  [facility]  
   
                                        Comment on above:   Performed By: #### A  
1C ####  
[facility]  
[address] [place], [place] [address]  
Dr. Ledy Howell   
   
                      Glucose [Mass/Vol] 111 mg/dL  Normal                [facility]  
   
                                        Comment on above:   Performed By: #### A  
1C ####  
[facility]  
[address] [id number]  
Dr. Ledy Howell   
   
                                                    HbA1c (Bld) [Mass   
fraction]       5.5 %           Normal          <=6.0           [facility]  
   
                                        Comment on above:   Performed By: #### A  
1C ####  
[facility]  
[address] [address]  
Dr. Ledy Howell   
   
                                                    LIPID PROFILEon 2022   
   
                      CHOL-HDL RATIO NORM SEE BELOW  Normal                [facility]  
   
                                        Comment on above:   Result Comment: 3.3   
- 4.4 LOW RISK 4.4 - 7.1 AVERAGE RISK 7.1   
-   
11.0 MODERATE RISK >11.0 HIGH RISK   
   
                                                            Performed By: #### L  
IPID, CMP, T7, TSH ####  
[facility]  
[address], [place] [address]  
Dr. Ledy Howell   
   
                      Cholesterol [Mass/Vol] 237 mg/dL  Critically high <=200     
   [facility]  
   
                                        Comment on above:   Performed By: #### L  
IPID, CMP, T7, TSH ####  
[facility]  
[address], [place] [address]  
Dr. Ledy Howell   
   
                                                    Cholesterol in HDL   
[Mass/Vol]      50 mg/dL        Normal                          [facility]  
   
                                        Comment on above:   Performed By: #### L  
IPID, CMP, T7, TSH ####  
[facility]  
[address], [place] [address]  
Dr. Ledy Howell   
   
                                                    Cholesterol in LDL   
[Mass/Vol]      144.8 mg/dL     Normal                          [facility]  
   
                                        Comment on above:   Performed By: #### L  
IPID, CMP, T7, TSH ####  
[facility]  
[address], [place] [address]  
Dr. Ledy Howell   
   
                                                    Cholesterol.total/Chol  
esterol in HDL [Mass   
ratio]          4.7 {ratio}     Normal                          [facility]  
   
                                        Comment on above:   Performed By: #### L  
IPID, CMP, T7, TSH ####  
[facility]  
[address] [address]  
Dr. Ledy Howell   
   
                                        HDL NORMAL          > or = 60 mg/dl -   
LOW CARDIOVASCULAR   
RISK <40 mg/dl -   
HIGH CARDIOVASCULAR   
RISK                Normal                                  [facility]  
   
                                        Comment on above:   Performed By: #### L  
IPID, CMP, T7, TSH ####  
[facility]  
[address] [address]  
Dr. Ledy Howell   
   
                      LDL CALC NORMAL SEE BELOW  Normal                [facility]  
   
                                        Comment on above:   Result Comment: <100  
 mg/dl OPTIMAL 100 - 129 mg/dl NEAR OR   
ABOVE OPTIMAL 130 - 159 mg/dl BORDERLINE HIGH 160 - 189 mg/dl   
HIGH >190 mg/dl VERY HIGH   
   
                                                            Performed By: #### L  
IPID, CMP, T7, TSH ####  
[facility]  
[address] [address]  
Dr. Ledy Howell   
   
                                                    Triglyceride   
[Mass/Vol]      211 mg/dL       Critically high <=150           [facility]  
   
                                        Comment on above:   Performed By: #### L  
IPID, CMP, T7, TSH ####  
[facility]  
[address] [address]  
Dr. Ledy Howell   
   
                      VLDL CALC  42.2 mg/dL Normal                [facility]  
   
                                        Comment on above:   Performed By: #### L  
IPID, CMP, T7, TSH ####  
[facility]  
[address] [address]  
Dr. Ledy Howell   
   
                                                    PROF 14(COMP METB)on   
022   
   
                      Albumin [Mass/Vol] 3.9 g/dL   Normal     3.5-5.0    [facility]  
   
                                        Comment on above:   Performed By: #### L  
IPID, CMP, T7, TSH ####  
[facility]  
[address] [address]  
Dr. Ledy Howell   
   
                                                    Albumin/Globulin [Mass   
ratio]          1.1 {ratio}     Normal                          [facility]  
   
                                        Comment on above:   Performed By: #### L  
IPID, CMP, T7, TSH ####  
[facility]  
[address] [address]  
Dr. Ledy Howell   
   
                                                    ALP [Catalytic   
activity/Vol]   55 U/L          Normal                    [facility]  
   
                                        Comment on above:   Performed By: #### L  
IPID, CMP, T7, TSH ####  
[facility]  
[address] [place] [address]  
Dr. Ledy Howell   
   
                                                    ALT [Catalytic   
activity/Vol]   53 U/L          Critically high 9-52            [facility]  
   
                                        Comment on above:   Performed By: #### L  
IPID, CMP, T7, TSH ####  
[facility]  
[address] [place] [address]  
Dr. Ledy Howell   
   
                      Anion gap [Moles/Vol] 13.0 mmol/L Normal                [facility]  
e [facility]  
   
                                        Comment on above:   Performed By: #### L  
IPID, CMP, T7, TSH ####  
[facility]  
[address] [place] [address]  
Dr. Ledy Howell   
   
                                                    AST [Catalytic   
activity/Vol]   23 U/L          Normal          14-36           [facility]  
   
                                        Comment on above:   Performed By: #### L  
IPID, CMP, T7, TSH ####  
[facility]  
[address] [place] [address]  
Dr. Ledy Howell   
   
                      Bilirubin [Mass/Vol] 0.5 mg/dL  Normal     0.2-1.3    The   
[facility]  
   
                                        Comment on above:   Performed By: #### L  
IPID, CMP, T7, TSH ####  
[facility]  
[address] [place] [address]  
Dr. Ledy Howell   
   
                      Calcium [Mass/Vol] 8.8 mg/dL  Normal     8.4-10.2   [facility]  
   
                                        Comment on above:   Performed By: #### L  
IPID, CMP, T7, TSH ####  
[facility]  
[address] [place] [address]  
Dr. Ledy Howell   
   
                      Chloride [Moles/Vol] 101 mmol/L Normal          [facility]   
[facility]  
   
                                        Comment on above:   Performed By: #### L  
IPID, CMP, T7, TSH ####  
[facility]  
[address] [place] [address]  
Dr. Ledy Howell   
   
                      CO2 [Moles/Vol] 26.9 mmol/L Normal     22.0-30.0  [facility]  
   
                                        Comment on above:   Performed By: #### L  
IPID, CMP, T7, TSH ####  
[facility]  
[address] [address]  
Dr. Ledy Howell   
   
                      Creatinine [Mass/Vol] 0.65 mg/dL Normal     0.52-1.04  [facility]  
   
                                        Comment on above:   Performed By: #### L  
IPID, CMP, T7, TSH ####  
[facility]  
[address] [address]  
Dr. Ledy Howell   
   
                      EGFR-AF AMERICAN >60        Normal     >=60       [facility]  
   
                                        Comment on above:   Performed By: #### L  
IPID, CMP, T7, TSH ####  
[facility]  
[address] [address]  
Dr. Ledy Howell   
   
                      EGFR-NON AF AMERICAN >60        Normal     >=60       [facility]  
   
                                        Comment on above:   Performed By: #### L  
IPID, CMP, T7, TSH ####  
[facility]  
[address] [place] [address]  
Dr. Ledy Howell   
   
                                                    Globulin (S)   
[Mass/Vol]      3.4 g/dL        Normal                          [facility]  
   
                                        Comment on above:   Performed By: #### L  
IPID, CMP, T7, TSH ####  
[facility]  
[address] [address]  
Dr. Ledy Howell   
   
                      Glucose [Mass/Vol] 108 mg/dL  Critically high      T  
[facility]  
   
                                        Comment on above:   Performed By: #### L  
IPID, CMP, T7, TSH ####  
[facility]  
[address] [address]  
Dr. Ledy Howell   
   
                      Potassium [Moles/Vol] 3.9 mmol/L Normal     3.4-5.0    [facility]  
   
                                        Comment on above:   Performed By: #### L  
IPID, CMP, T7, TSH ####  
[facility]  
[address] [address]  
Dr. Ledy Howell   
   
                      Protein [Mass/Vol] 7.3 g/dL   Normal     6.1-8.2    [facility]  
   
                                        Comment on above:   Performed By: #### L  
IPID, CMP, T7, TSH ####  
[facility]  
[address] [address]  
Dr. Ledy Howell   
   
                      Sodium [Moles/Vol] 137 mmol/L Normal     137-145    [facility]  
   
                                        Comment on above:   Performed By: #### L  
IPID, CMP, T7, TSH ####  
[facility]  
[address] [place] [address]  
Dr. Ledy Howell   
   
                                                    Urea nitrogen   
[Mass/Vol]      11.0 mg/dL      Normal          7.0-17.0        [facility]  
   
                                        Comment on above:   Performed By: #### L  
IPID, CMP, T7, TSH ####  
[facility]  
[address] [place] [address]  
Dr. Ledy Howell   
   
                                                    Urea   
nitrogen/Creatinine   
[Mass ratio]    16.9 mg/mg      Normal                          [facility]  
   
                                        Comment on above:   Performed By: #### L  
IPID, CMP, T7, TSH ####  
[facility]  
[address] [place] [address]  
Dr. Ledy Howell   
   
                                                    TSHon 2022   
   
                      TSH        3.650 uIU/mL Normal     0.470-4.680 [facility]  
   
                                        Comment on above:   Performed By: #### L  
IPID, CMP, T7, TSH ####  
[facility]  
[address] [place] [address]  
Dr. Ledy Howell   
   
                      TSH RANGE  SEE BELOW  Normal                [facility]  
   
                                        Comment on above:   Result Comment: <0.3  
4 UIU/ml HYPERTHYROID 0.34-5.60 UIU/ml   
EUTHYROID >5.60 UIU/ml HYPOTHYROID   
   
                                                            Performed By: #### L  
IPID, CMP, T7, TSH ####  
[facility]  
[address] [place] [address]  
Dr. Ledy Howell   
  
  
  
Vital Signs  
  
  
                      Date Time  Vital Sign Value      Performing Clinician Sadafi  
lity  
   
                                                    10-   
20:          Body height         170.2 cm            Shanon Noland DO  
Work Phone:   
[phone]                          [facility]  
   
                                                    10-   
20:                              Body mass index (BMI)   
[Ratio]                   28.82 kg/m2               Shanon Noland DO  
Work Phone:   
[phone]                          [facility]  
   
                                                    10-   
20:          Body weight         83.46 kg            Shanon Noland DO  
Work Phone:   
[phone]                          [facility]  
   
                                                    10-   
20:          Body temperature    97.59 [degF]        Shanon Noland DO  
Work Phone:   
[phone]                          [facility]  
   
                                                    10-   
20:                              Diastolic blood   
pressure                  68 mm[Hg]                 Shanon Noland DO  
Work Phone:   
[phone]                          [facility]  
   
                                                    10-   
20:          Heart rate          79 /min             Shanon Noland DO  
Work Phone:   
[phone]                          [facility]  
   
                                                    10-   
20:          Respiratory rate    18 /min             Shanon Noland DO  
Work Phone:   
[phone]                          [facility]  
   
                                                    10-   
20:                              SaO2% (BldA) [Mass   
fraction]                 93 %                      Shanon Noland DO  
Work Phone:   
[phone]                          [facility]  
   
                                                    10-   
20:                              Systolic blood   
pressure                  126 mm[Hg]                Shanon Noland DO  
Work Phone:   
[phone]                          [facility]  
  
  
  
Encounters  
  
  
                          Encounter Date Encounter Type Care Provider Facility  
   
                                                    Start: 2024  
End: 2024     ambulatory          [facility]  
   
                                                    Start: 2024  
End: 2024     ambulatory          [facility]   
[facility]  
   
                                        Start: 2024   Encounter for   
gynecological examination   
(general) (routine)   
without abnormal findings [facility]   
[facility]  
   
                                                    Start: 2024  
End: 2024     ambulatory          [facility]  
   
                                Start: 2024 Telephone encounter Mayra Dean RN  
Work Phone:   
[phone]                          [facility]  
   
                                                    Start: 2024  
End: 2024     ambulatory          [facility]  
   
                                                    Start: 2024  
End: 2024     ambulatory          [facility]     Not Available  
   
                                                    Start: 2024  
End: 2024     ambulatory          [facility]  
   
                                        Start: 2024   Encounter for   
gynecological examination   
(general) (routine)   
without abnormal findings [facility]  
   
                          Start: 2022 ambulatory   DR ELEN WOOD Facility  
:[facility]  
   
                                                    Start: 10-  
End: 10-                         Emergency department   
patient visit             ELEN GALLAGHER ANTHONY             [facility]  
   
                                                    Start: 10-  
End: 10-                         Emergency department   
patient visit                           Shanon Noland DO  
Work Phone:   
[phone]                          [facility]   
ED  
   
                                        Comment on above:   Fall from steps, ini  
tial encounter (Primary Dx);  
Cyst of right ovary;  
Contusion of forehead, initial encounter   
   
                          Start: 2022 ambulatory   DR ELEN WOOD Facility  
:[facility]  
   
                                        Start: 2022   Encounter for genera  
l   
adult medical examination   
without abnormal findings DR ELEN WOOD            [facility]  
   
                                                    Start: 2022  
End: 2022     ambulatory          DR ELEN WOOD      Facility:[facility]  
   
                                                    Start: 2022  
End: 2022                         Encounter for general   
adult medical examination   
without abnormal findings DR ELEN WOOD            Facility:[facility]  
  
  
  
Procedures  
  
  
                          Date         Procedure    Procedure Detail Performing   
Clinician  
   
                          Start: 2024 Mammography               Mayra Dean RN  
Work Phone:   
[phone]  
   
                                        Start: 10-   Ct head/brain w/o co  
ntrast   
material                                            Shanon Noland DO  
Work Phone:   
[phone]  
   
                                        Start: 10-   Ct cervical spine w/  
o   
contrast material                                   Shanon Noland DO  
Work Phone:   
[phone]  
   
                                        Start: 10-   Ct thorax w/contrast  
   
material                                            Shanon Noland DO  
Work Phone:   
[phone]  
   
                          Start: 10- Assay of ethanol              Ross Noland DO  
Work Phone:   
[phone]  
   
                                        Start: 10-   Basic metabolic pane  
l   
calcium total                                       Shanon Noland DO  
Work Phone:   
[phone]  
  
  
  
Plan of Treatment  
  
  
                          Date         Care Activity Detail       Author  
   
                                                    Start:   
2025                              Screening for   
malignant neoplasm of   
breast                    Mammogram                 [facility]  
   
                                                    Start:   
2022  
End: 2022                         Patient encounter   
procedure                               2022 Office Visit   
Gastroenterology Jan,   
Kristyn N, MD [address] [place] [address] [place] [address] [phone] (Work)   
[phone] (Fax)                      [facility]  
   
                                                    Start:   
2022          Influenza vaccination Flu vaccine (#1)    [facility]  
   
                                                    Start:   
2021                              COVID-19 Vaccine (4 -   
Booster for Pfizer   
series)                                 COVID-19 Vaccine (4 -   
Booster for Pfizer series)              [facility]  
   
                                                    Start:   
2018                              Screening for   
malignant neoplasm of   
colon                                               [facility]  
   
                                                    Start:   
2013          Lipid panel         Lipids              [facility]  
   
                                                    Start:   
2008          Diabetes screen     Diabetes screen     [facility]  
   
                                                    Start:   
2003                              Screening for   
malignant neoplasm of   
cervix                                              [facility]  
   
                                                    Start:   
1994                              Screening for   
malignant neoplasm of   
cervix                    Pap smear                 [facility] [facility]  
   
                                                    Start:   
1992                              DTaP/Tdap/Td vaccine   
(1 - Tdap)                              DTaP/Tdap/Td vaccine (1 -   
Tdap)                                   [facility]  
   
                                                    Start:   
1991          Hepatitis C screening Hepatitis C screen  [facility]  
   
                                                    Start:   
1988          HIV screening       HIV screen          [facility]  
   
                                                    Start:   
1985          Depression Screen   Depression Screen   [facility]  
   
                                                    Start:   
1973                              Screening for   
malignant neoplasm of   
colon                                               [facility]  
  
  
  
Immunizations  
  
  
                      Immunization Date Immunization Notes      Care Provider Fa  
cility  
   
                                        2023          Influenza, injectabl  
e,   
Madin Dianna Canine   
Kidney, preservative   
free, quadrivalent                                  Mayra Dianne RN  
Work Phone:   
[phone]                          [facility]  
   
                                        2023          zoster vaccine   
recombinant                                         Mayra Whitz RN  
Work Phone:   
[phone]                          [facility]  
  
  
  
Payers  
  
  
                          Date         Payer Category Payer        Policy ID  
   
                                2023      Unknown         [employer] M  
EDICAL   
MUTUAL [id number]   
2023-Present [address]   
[address] [place], [place]   
[address]                              [id number]  
[id number]  
   
                          1973   Unknown                   [id number]   
[id number]  
   
                          1973   Unknown                   [id number]   
[id number]  
   
                          1973   Unknown                   [id number]   
[id number]  
   
                          1973   Unknown                   [id number]   
[id number]  
   
                          1973   Unknown                   [id number]   
[id number]  
   
                          1973   Unknown                   [id number]   
[id number]  
   
                          1973   Unknown                   [id number]   
[id number]  
   
                          1973   Unknown                   [id number]   
[id number]  
   
                          1973   Unknown                   [id number]   
[id number]  
   
                          1973   Unknown                   [id number]   
[id number]  
   
                          1973   Unknown                   [id number]   
[id number]  
   
                          1973   Unknown                   [id number]   
[id number]  
   
                          1960   Self-pay                    
   
                          1960   Unknown                   [id number]   
[id number]  
[id number]  
  
  
  
Social History  
  
  
                          Date         Type         Detail       Facility  
   
                                                    Start:   
2018  
End: 2024                         Tobacco smoking status   
[facility]                      Ex-smoker                 [facility] Phone:   
[phone]  
   
                                                    Start:   
07-  
End: 09-     History of tobacco use Current smoker      [facility] Phone:   
[phone]  
   
                                                    Start:   
2018  
End: 2024                         Tobacco use and   
exposure                                Smokeless tobacco   
non-user                                [facility] Phone:   
[phone]  
   
                                                    Start:   
2018  
End: 2024           Alcohol intake            Current drinker of   
alcohol (finding)                       [facility] Phone:   
[phone]  
   
                                                    Start:   
2018          Alcohol Comment     occassionally       [facility] Phone:   
[phone]  
   
                                                    Start:   
1975          Sex Assigned At Birth Not on file         [facility] Phone:   
[phone]  
   
                                                    Start:   
10-  
End: 10-                         Exposure to SARS-CoV-2   
(event)                   Not sure                  [employer]  
   
                                                    Start:   
07-  
End: 09-     History of tobacco use Cigarette Smoker    [facility]  
   
                                                    Start:   
2024                              Cigarettes smoked   
current (pack per day)   
- Reported                0.5                       [facility]  
   
                                                    Start:   
2024          Tobacco use panel                       [facility]  
  
  
  
Telephone encounter Note 2024  
       Telephone Encounter - Luisa Christianson MD - 2024 1:34 PM EST  
  
                                Note Date & Type Note            Facility  
   
                                                    2024 Telephone   
encounter Note                          Formatting of this note might   
be different from the   
original.  
ok  
Electronically signed by   
Luisa Christianson MD at   
2024 1:35 PM EST  
                                        [facility]  
Work Phone: [phone]  
  
  
  
Note 2024  
  Telephone Encounter - Luisa Christianson MD - 2024 1:34 PM ESTTelephone   
Encounter - Mayra Dean RN - 2024 8:31 AM EST  
  
                                Note Date & Type Note            Facility  
   
                                        2024 Miscellaneous Notes Formattin  
g of this note might be   
different from the original.  
ok  
Electronically signed by Luisa Christianson MD at 2024 1:35 PM EST  
Formatting of this note might be   
different from the original.  
TC from pt. States her s/s have   
completely cleared and she does not   
wish to proceed with CT at this time.   
States she will call us in future if   
s/s return. Dr. Christianson notified.   
Follow up appt for  canceled.   
Hospital notified.  
Electronically signed by Mayra Dean RN   
at 2024 8:34 AM EST  
documented in this encounter            [facility]  
  
  
  
Telephone encounter Note 2024  
          Telephone Encounter - Mayra Dean RN - 2024 8:31 AM EST  
  
                                Note Date & Type Note            Facility  
   
                                                    2024 Telephone encount  
er   
Note                                    Formatting of this note might be   
different from the original.  
TC from pt. States her s/s have   
completely cleared and she does not   
wish to proceed with CT at this   
time. States she will call us in   
future if s/s return. Dr. Christianson   
notified. Follow up appt for    
canceled. Hospital notified.  
Electronically signed by Mayra Dean RN at 2024 8:34 AM EST  
                                        [facility]  
  
  
  
Hospital Discharge instructions 10-  
                             Discharge Instructions  
  
                                Note Date & Type Note            Facility  
   
                                                    10- Hospital Discharg  
e   
instructions                              
  
  
Shanon Noland DO -   
10/28/2022 11:01 PM   
EDTFormatting of this note   
might be different from the   
original.  
Follow-up with your OB/GYN   
regarding your right ovarian   
cyst. You will wake up   
tomorrow in significant pain.   
Please use pain medication as   
prescribed to help with your   
discomfort, please take muscle   
relaxant to help also with   
your discomfort okay to apply   
ice to areas of pain and   
swelling, and a use your   
incentive spirometer to help   
with inhalation and   
exhalation. Please use this   
every hour throughout the day.   
Return to ER for worsening   
pain shortness of breath   
difficulty breathing,   
numbness, tingling or   
weakness.  
Electronically signed by   
Shanon Noland DO at   
10/28/2022 11:01 PM EDT  
  
documented in this encounter            [facility] Phone:   
[phone]  
  
  
  
Evaluation note  
  
  
                                Note Date & Type Note            Facility  
  
  
  
                                                    Evaluation note   
  
  
  
                                                    Diagnosis  
   
                                                      
  
  
Fall from steps, initial encounter- Primary  
   
                                                      
  
  
Cyst of right ovary  
  
  
Other and unspecified ovarian cyst  
   
                                                      
  
  
Contusion of forehead, initial encounter  
  
documented in this encounter  
[facility] Phone: [phone]  
  
Summary Purpose  
  
  
                                                      
  
  
  
Family History  
No Family History Records FoundNo Family History Records FoundNo Family History 
Records FoundNo Family History Records FoundNo Family History Records FoundNo 
Family History Records Found  
  
Advance Directives  
No Advanced Directives Records FoundNo Advanced Directives Records FoundNo 
Advanced Directives Records FoundNo Advanced Directives Records FoundNo Advanced
 Directives Records FoundNo Advanced Directives Records Found  
  
Additional Source Comments  
  
  
  
                                                    Reason for Visit (unrecogniz  
ed section and content)  
   
                                          
  
  
  
                                        Reason              Comments  
   
                                        Fall                  
   
                                        Rib Injury            
   
                                        Head Injury         Patient fell forward  
 down a flight of stairs just prior to arrival.   
Hit   
head on ground. Left side rib pain.  
  
  
  
  
  
                                                    Ordered Prescriptions (unrec  
ognized section and content)  
   
                                          
  
  
  
                    Prescription Sig       Dispensed Refills   Start Date End Da  
te  
   
                                                      
  
  
ibuprofen (IBU) 800 MG   
tablet                                  Take 1 tablet by   
mouth every 8 hours   
as needed for Pain                        
  
  
21 tablet           0                   10/28/2022            
   
                                                      
  
  
acetaminophen (TYLENOL)   
325 MG tablet                           Take 2 tablets by   
mouth every 6 hours   
as needed for Pain                        
  
  
40 tablet           0                   10/28/2022            
   
                                                      
  
  
cyclobenzaprine   
(FLEXERIL) 10 MG tablet                 Take 1 tablet by   
mouth 3 times daily   
as needed for   
Muscle spasms                             
  
  
21 tablet           0                   10/28/2022          2022  
  
  
  
                                       PRN  
  
                                                    Active and Recently Administ  
ered Medications (unrecognized section and content)  
   
                                          
  
  
  
                          Medication Order 10/26/2022   10/27/2022   10/28/2022  
   
                                                      
  
  
iopamidol (ISOVUE-370) 76 %   
injection 75 mL (COMPLETED)  
75 mL, IntraVENous, IMG ONCE   
PRN, 1 dose, Starting on Fri   
10/28/22 at 2123, Until Fri   
10/28/22 at 212, Other  
                                                             (Given - Provid  
er: Suly Meza)  
  
  
  
  
  
  
                                                    Care Teams (unrecognized sec  
tion and content)  
   
                                          
  
  
  
                      Team Member Relationship Specialty  Start Date End Date  
   
                                                      
  
  
Elen Wood MD  
  
  
[address], [place] [id number] [phone] (Work)  
  
  
[phone] (Fax) PCP - General                   16           
  
  
  
                      Team Member Relationship Specialty  Start Date End Date  
   
                                                      
  
  
Elen Wood MD  
  
  
NPI: [id number]  
  
  
[address] [place] [address]  
  
  
[phone] (Work)  
  
  
[phone] (Fax) PCP - General   Family Medicine 24            
  
  
  
  
  
                                                    INFORMATION SOURCE (unrecogn  
ized section and content)  
   
                                          
  
  
  
                                        DATE CREATED        AUTHOR  
   
                                10/30/2022                      [facility]  
  
  
  
                                DATE CREATED    AUTHOR          'S ORGANIZ  
ATION  
   
                                2022                      [facility]  
  
  
  
                                DATE CREATED    AUTHOR          AUTHOR'S ORGANIZ  
ATION  
   
                                2024                      [facility]  
[facility]  
  
  
  
                                DATE CREATED    AUTHOR          AUTHOR'S ORGANIZ  
ATION  
   
                                2024                      [facility]  
  
  
  
                                DATE CREATED    AUTHOR          AUTHOR'S ORGANIZ  
ATION  
   
                                2024                      [facility]  
[facility]  
  
  
  
                                DATE CREATED    AUTHOR          AUTHOR'S ORGANIZ  
ATION  
   
                                2024                      [facility]  
  
  
FOR RECORDS PERTAINING TO PATIENTS WHO ARE OR HAVE BEEN ENROLLED IN A CHEMICAL 
DEPENDENCY/SUBSTANCEABUSE PROGRAM, SOME INFORMATION MAY BE OMITTED. This 
clinical summary was aggregated from multiple sources. Caution should be 
exercised in using it in the provision of clinical care. This summary normalizes
 information from multiple sources, and as a consequence, information in this 
document may materially change the coding, format and clinical context of 
patient data. In addition, data may be omitted in some cases. CLINICAL DECISIONS
 SHOULD BE BASED ON THE PRIMARY CLINICAL RECORDS. [facility] [employer] [facility]. provides
 no warranty or guarantee of the accuracy or completeness of information in this
 document.

## 2024-10-02 NOTE — XR_ITS
The [address] [address] 
     [phone] 
  
  
Patient Name: 
MARIANNA PAUL 
  
MRN: [id number]    YOB: 1975    Sex: F 
Assigned Patient Location: RAD 
Current Patient Location:   
Accession/Order Number: [id number] 
Exam Date: 10/02/2024  17:40    Report Date: 10/03/2024  08:24 
  
At the request of: 
ELEN WEEMS   
  
Procedure:  XR hip LT 2V w/ pelvis 
  
PROCEDURE: XR hip LT 2V w/ pelvis 
  
COMPARISON: None. 
  
HISTORY: M25.559, HIP PAIN 
  
FINDINGS: 
BONES:No acute fracture or dislocation. Mild bilateral hip osteoarthritis with  
  
marginal osteophyte formation  
SOFT TISSUES:Negative. No visible soft tissue swelling.  
EFFUSION:None visible.  
OTHER: Vascular coils left medial abdomen 
  
ORDER #: [id number] XR/XR hip LT 2V w/ pelvis  
IMPRESSION:  
   
Mild bilateral hip osteoarthritis  
   
   
Electronically authenticated by: ANURADHA MYERS   Date: 10/03/2024  08:24

## 2025-01-07 ENCOUNTER — HOSPITAL ENCOUNTER
Dept: HOSPITAL 101 - LAB | Age: 50
Discharge: HOME | End: 2025-01-07
Payer: COMMERCIAL

## 2025-01-07 DIAGNOSIS — Z00.00: Primary | ICD-10-CM

## 2025-01-07 LAB
ADD MANUAL DIFF: NO
ALANINE AMINOTRANSFERASE: 48 U/L (ref 14–59)
ALBUMIN GLOBULIN RATIO: 1.3
ALBUMIN LEVEL: 3.9 G/DL (ref 3.4–5)
ALKALINE PHOSPHATASE: 57 U/L (ref 46–116)
ANION GAP: 12.9
ASPARTATE AMINO TRANSFERASE: 20 U/L (ref 15–37)
BLOOD UREA NITROGEN: 13 MG/DL (ref 7–18)
CALCIUM: 8.9 MG/DL (ref 8.5–10.1)
CARBON DIOXIDE: 29.3 MMOL/L (ref 21–32)
CHLORIDE: 100 MMOL/L (ref 98–107)
CHOLESTEROL: 259 MG/DL (ref ?–200)
CO2 BLD-SCNC: 29.3 MMOL/L (ref 21–32)
ESTIMATED GFR (AFRICAN AMERICA: >60
ESTIMATED GFR (NON-AFRICAN AME: >60
FREE T3: 2.21 PG/ML (ref 2.18–3.98)
GLOBULIN: 2.9 G/DL
GLUCOSE BLD-MCNC: 105 MG/DL (ref 74–106)
HCT VFR BLD CALC: 41.1 % (ref 36–48)
HDL CHOLESTEROL: 51 MG/DL (ref 40–60)
HEMATOCRIT: 41.1 % (ref 36–48)
HEMOGLOBIN: 13.8 G/DL (ref 12–16)
IMMATURE GRANULOCYTES ABS AUTO: 0.01 10^3/UL (ref 0–0.03)
IMMATURE GRANULOCYTES PCT AUTO: 0.2 % (ref 0–0.5)
IRON: 129 UG/DL (ref 50–170)
LYMPHOCYTES  ABSOLUTE AUTO: 1.6 10^3/UL (ref 1.2–3.8)
MCV RBC: 92.6 FL (ref 81–99)
MEAN CORPUSCULAR HEMOGLOBIN: 31.1 PG (ref 26.7–34)
MEAN CORPUSCULAR HGB CONC: 33.6 G/DL (ref 29.9–35.2)
MEAN CORPUSCULAR VOLUME: 92.6 FL (ref 81–99)
PLATELET # BLD: 200 10^3/UL (ref 150–450)
PLATELET COUNT: 200 10^3/UL (ref 150–450)
POTASSIUM SERPLBLD-SCNC: 4.2 MMOL/L (ref 3.5–5.1)
POTASSIUM: 4.2 MMOL/L (ref 3.5–5.1)
RED BLOOD COUNT: 4.44 10^6/UL (ref 4.2–5.4)
SODIUM BLD-SCNC: 138 MMOL/L (ref 136–145)
SODIUM: 138 MMOL/L (ref 136–145)
THYROID STIMULATING HORMONE: 2.36 UIU/ML (ref 0.36–3.74)
TOTAL PROTEIN: 6.8 G/DL (ref 6.4–8.2)
TRIGLYCERIDES: 152 MG/DL (ref ?–150)
VLDL CHOLESTEROL: 30.4 MG/DL
WBC # BLD: 4.6 10^3/UL (ref 4–11)
WHITE BLOOD COUNT: 4.6 10^3/UL (ref 4–11)

## 2025-01-07 PROCEDURE — 83036 HEMOGLOBIN GLYCOSYLATED A1C: CPT

## 2025-01-07 PROCEDURE — 36415 COLL VENOUS BLD VENIPUNCTURE: CPT

## 2025-01-07 PROCEDURE — 83525 ASSAY OF INSULIN: CPT

## 2025-01-07 PROCEDURE — 80053 COMPREHEN METABOLIC PANEL: CPT

## 2025-01-07 PROCEDURE — 84481 FREE ASSAY (FT-3): CPT

## 2025-01-07 PROCEDURE — 85025 COMPLETE CBC W/AUTO DIFF WBC: CPT

## 2025-01-07 PROCEDURE — 84443 ASSAY THYROID STIM HORMONE: CPT

## 2025-01-07 PROCEDURE — 80061 LIPID PANEL: CPT

## 2025-01-07 PROCEDURE — 84436 ASSAY OF TOTAL THYROXINE: CPT

## 2025-01-07 PROCEDURE — 83540 ASSAY OF IRON: CPT

## 2025-01-30 ENCOUNTER — HOSPITAL ENCOUNTER (OUTPATIENT)
Dept: PHYSICAL THERAPY | Age: 50
Setting detail: THERAPIES SERIES
Discharge: HOME | End: 2025-01-30
Payer: COMMERCIAL

## 2025-01-30 PROCEDURE — 97110 THERAPEUTIC EXERCISES: CPT

## 2025-01-30 PROCEDURE — 97161 PT EVAL LOW COMPLEX 20 MIN: CPT

## 2025-02-05 ENCOUNTER — HOSPITAL ENCOUNTER (OUTPATIENT)
Dept: PHYSICAL THERAPY | Age: 50
Setting detail: THERAPIES SERIES
Discharge: HOME | End: 2025-02-05
Payer: COMMERCIAL

## 2025-02-05 PROCEDURE — 97113 AQUATIC THERAPY/EXERCISES: CPT

## 2025-02-06 NOTE — PROGRESS NOTES
Phone: [phone]                 [facility]           Fax: [phone]                           Outpatient Physical Therapy                                                                            Daily Note    Patient: Jemma Olmstead : 1975  [facility] #: [id number]   Referring Physician: Darien Keller MD  Date: 2025    Diagnosis: L hip pain, M25.552  Treatment Diagnosis: Bilateral hip pain, bilateral hip OA, possible ANNE    Onset Date: 24  PT Insurance Information: [facility]  Total # of Visits Approved: 8 Per Physician Order  Total # of Visits to Date: 2  No Show: 0  Canceled Appointment: 0    25 Plan of Care/Recert Due    Pre-Treatment Pain:  3/10  Subjective: Pt complains of carloz hip pain R>L. States pain is deep in anterior hip.    Exercises:  Exercise 1: HEP: quadraped rocking, 5-10 reps twice per day, Bent knee fallouts with PPT 5-10 twice per day, bridge 2x6 once per day  Exercise 2: Educated on reducing joint forces at the gym and not treadmill walking daily.  Exercise 3: aq-- walking in all directions  Exercise 4: aq--FSU at steps 15x carloz  Exercise 5: aq--side kick, back kicks marching 15x  Exercise 6: aq--squats and lunges 15x  Exercise 7: aq--bench hip stretching --pirformis FABERS stretch  Exercise 8: aq--standing hip flexor stretching carloz.      Assessment  Assessment: Pt with 3/10 hip pain R>L. Pt initiated on aquatice exercise with good tolerance. Pt hep was reviewed. Will progress as tolerated    Activity Tolerance  Activity Tolerance: Patient tolerated treatment well    Patient Education  Patient Education: Aquatic exercise  Pt verbalized/demonstrated good understanding:     [x] Yes         [] No, pt required further clarification.       Post Treatment Pain:  3/10      Plan  Plan Frequency: 2  Plan weeks: 4       Goals  (Total # of Visits to Date: 2)      Short Term Goals  Time Frame for Short Term Goals: 2 weeks  Short Term Goal 2: Patient will

## 2025-02-07 ENCOUNTER — HOSPITAL ENCOUNTER (OUTPATIENT)
Dept: PHYSICAL THERAPY | Age: 50
Setting detail: THERAPIES SERIES
Discharge: HOME | End: 2025-02-07
Payer: COMMERCIAL

## 2025-02-07 PROCEDURE — 97113 AQUATIC THERAPY/EXERCISES: CPT

## 2025-02-07 NOTE — PROGRESS NOTES
Phone: [phone]                 [facility]           Fax: [phone]                           Outpatient Physical Therapy                                                                            Daily Note    Patient: Jemma Olmstead : 1975  CSN #: [id number]   Referring Physician: Darien Keller MD  Date: 2025    Diagnosis: L hip pain, M25.552  Treatment Diagnosis: Bilateral hip pain, bilateral hip OA, possible ANNE    Onset Date: 24  PT Insurance Information: [facility]  Total # of Visits Approved: 8 Per Physician Order  Total # of Visits to Date: 3  No Show: 0  Canceled Appointment: 0    25 Plan of Care/Recert Due    Pre-Treatment Pain:  1/10  Subjective: Pt reports mild soreness after last session that went away with stretching. She reports only 1/10 pain today.    Exercises:  Exercise 1: HEP: quadraped rocking, 5-10 reps twice per day, Bent knee fallouts with PPT 5-10 twice per day, bridge 2x6 once per day  Exercise 2: Educated on reducing joint forces at the gym and not treadmill walking daily.  Exercise 3: aq-- walking in all directions  Exercise 4: aq--FSU at steps 15x carloz  Exercise 5: aq--side kick, back kicks, butt kicks, marching 15x  Exercise 6: aq--squats and lunges 15x  Exercise 7: aq--semi deep back to wall pink board push downs and push/pulls x15 ea  Exercise 8: aq--standing hip flexor stretching bi [id number]  Exercise 9: aq: deep water hanging with 3# ankle weights and floaties to offload the hips x5 min, scissor kicks abd/add and fwd/bwd x2 min ea    Assessment  Assessment: Patient reported relief of pain with deep water hanging/relaxation today. Will continue to progress strength and ROM as tolerated.    Activity Tolerance  Activity Tolerance: Patient tolerated treatment well    Patient Education  Exercise technique and rationale   Pt verbalized/demonstrated good understanding:     [x] Yes         [] No, pt required further clarification.

## 2025-02-10 ENCOUNTER — HOSPITAL ENCOUNTER (OUTPATIENT)
Dept: PHYSICAL THERAPY | Age: 50
Setting detail: THERAPIES SERIES
Discharge: HOME | End: 2025-02-10
Payer: COMMERCIAL

## 2025-02-10 PROCEDURE — 97113 AQUATIC THERAPY/EXERCISES: CPT

## 2025-02-10 NOTE — SIGNIFICANT EVENT
[facility]  Inpatient/Observation/Outpatient Rehabilitation    Date: 2/10/2025  Patient Name: Jemma Olmstead       [] Inpatient Acute/Observation       [x]  Outpatient  : 1975     Plan of Care/Recert ends      [] Pt refused/declined therapy at this time due to:           [x] Pt cancelled due to:  [] No Reason Given   [] Sick/ill   [x] Other:  conflicting appt     [] Evaluation held by RN/Provider due to:    [] High Heart Rate   [] High Blood Pressure   [] Orthopedic Consult   [] Hgb < 7   [] Other:    [] Pt ordered brace per physician request:  [] Proper fit will be completed and education for wearing/skin checks    [] Pt does not require skilled services due to:      Therapist/Assistant will attempt to see this patient, at our earliest opportunity.       Raghav Paris Date: 2/10/2025

## 2025-02-10 NOTE — PROGRESS NOTES
of Visits to Date: 4)      Short Term Goals  Time Frame for Short Term Goals: 2 weeks  Short Term Goal 1: Patient will be initiated with a HEP-met  Short Term Goal 2: Patient will tolerate 30 minutes of aquatic exercise to improve bilateral hip ROM and strength in an unloaded environment.-met    Long Term Goals  Time Frame for Long Term Goals : 4 weeks  Long Term Goal 1: Patient will be independent and compliant with a HEP  Long Term Goal 2: Patient will improve bilateral hip flexion ROM to >/= 110* and internal rotation ROM to >35* to decrease pain with ADLs.  Long Term Goal 3: Patient will improve bilateral hip strength to >/= 4/5 in all major joints and planes  Long Term Goal 4: Patient will report decreased pain to < 6/10 at worst.  Long Term Goal 5: Patient will report 70% improvement in overall symptoms and function.    Minutes Tracking:  Time In: 1630  Time Out: 1713  Minutes: 43    Aquatic Therapy (43 minutes): Aquatic treatment performed per flow grid for  % diminished weight loading exercises and Decreased endurance.  Cues provided for technique.  Assistance by therapist provided for SUP.  Patient has difficulty with ROM and endurance.       Sandra Simms, PTA     Date: 2/10/2025

## 2025-02-12 ENCOUNTER — HOSPITAL ENCOUNTER (OUTPATIENT)
Dept: PHYSICAL THERAPY | Age: 50
Setting detail: THERAPIES SERIES
Discharge: HOME | End: 2025-02-12
Payer: COMMERCIAL

## 2025-02-17 ENCOUNTER — HOSPITAL ENCOUNTER (OUTPATIENT)
Dept: PHYSICAL THERAPY | Age: 50
Setting detail: THERAPIES SERIES
Discharge: HOME | End: 2025-02-17
Payer: COMMERCIAL

## 2025-02-17 PROCEDURE — 97113 AQUATIC THERAPY/EXERCISES: CPT

## 2025-02-17 NOTE — PROGRESS NOTES
well    Patient Education  Patient Education: Aquatic exercise  Pt verbalized/demonstrated good understanding:     [x] Yes         [] No, pt required further clarification.       Post Treatment Pain:  5/10      Plan  Plan Frequency: 2  Plan weeks: 4       Goals  (Total # of Visits to Date: 5)      Short Term Goals  Time Frame for Short Term Goals: 2 weeks  Short Term Goal 1: Patient will be initiated with a HEP-met  Short Term Goal 2: Patient will tolerate 30 minutes of aquatic exercise to improve bilateral hip ROM and strength in an unloaded environment.-met    Long Term Goals  Time Frame for Long Term Goals : 4 weeks  Long Term Goal 1: Patient will be independent and compliant with a HEP  Long Term Goal 2: Patient will improve bilateral hip flexion ROM to >/= 110* and internal rotation ROM to >35* to decrease pain with ADLs.  Long Term Goal 3: Patient will improve bilateral hip strength to >/= 4/5 in all major joints and planes  Long Term Goal 4: Patient will report decreased pain to < 6/10 at worst.  Long Term Goal 5: Patient will report 70% improvement in overall symptoms and function.    Minutes Tracking:  Time In: 1530  Time Out: 1630  Minutes: 60  Timed Code Treatment Minutes: 55 Minutes    Abdoul Hernadez     Date: 2/17/2025

## 2025-02-19 ENCOUNTER — HOSPITAL ENCOUNTER (OUTPATIENT)
Dept: PHYSICAL THERAPY | Age: 50
Setting detail: THERAPIES SERIES
Discharge: HOME | End: 2025-02-19
Payer: COMMERCIAL

## 2025-02-19 PROCEDURE — 97113 AQUATIC THERAPY/EXERCISES: CPT

## 2025-02-20 ENCOUNTER — APPOINTMENT (OUTPATIENT)
Dept: PHYSICAL THERAPY | Age: 50
End: 2025-02-20
Payer: COMMERCIAL

## 2025-02-20 NOTE — PROGRESS NOTES
Phone: [phone]                 [facility]           Fax: [phone]                           Outpatient Physical Therapy                                                                            Daily Note    Patient: Jemma Olmstead : 1975  [facility] #: [id number]   Referring Physician: Darien Keller MD  Date: 2025    Diagnosis: L hip pain, M25.552  Treatment Diagnosis: Bilateral hip pain, bilateral hip OA, possible ANNE    Onset Date: 24  PT Insurance Information: [facility]  Total # of Visits Approved: 8 Per Physician Order  Total # of Visits to Date: 7  No Show: 0  Canceled Appointment: 1    25 Plan of Care/Recert Due    Pre-Treatment Pain:  3/10  Subjective: Pt states her hip feels better then last session.    Exercises:  Exercise 1: HEP: quadraped rocking, 5-10 reps twice per day, Bent knee fallouts with PPT 5-10 twice per day, bridge 2x6 once per day  Exercise 3: aq-- walking in all directions,  walking lunges  Exercise 4: aq--FSU/SSU/SD  at steps 15x carloz // HS stretch on step 2x30\" ea  Exercise 5: aq--side kick, back kicks, butt kicks, marching 15x  Exercise 6: aq--squats and lunges 15x--squats this date  Exercise 7: aq--semi deep back to wall pink board push downs and push/pulls x15 ea  Exercise 8: aq--standing hip flexor stretching bi [id number], side hip adductor stretching 3x30 sec  Exercise 9: aq: deep water hanging with 3# ankle weights and floaties to offload the hips x5 min, scissor kicks abd/add and fwd/bwd x2 min ea  Exercise 10: aq: green dumbbell of 90/90 x15 ea      Assessment  Assessment: Relief reported with aquatic exercise. Pt tolerated exericse and ROM activities well in pool. Pain remains anterior and deep in hip. HEP reviewed with good understanding    Activity Tolerance  Activity Tolerance: Patient tolerated treatment well    Patient Education  Patient Education: HEP  Pt verbalized/demonstrated good understanding:     [x] Yes         [] No,

## 2025-02-24 ENCOUNTER — HOSPITAL ENCOUNTER (OUTPATIENT)
Dept: PHYSICAL THERAPY | Age: 50
Setting detail: THERAPIES SERIES
Discharge: HOME | End: 2025-02-24
Payer: COMMERCIAL

## 2025-02-24 NOTE — SIGNIFICANT EVENT
[facility]  Inpatient/Observation/Outpatient Rehabilitation    Date: 2025  Patient Name: Jemma Olmstead       [] Inpatient Acute/Observation       [x]  Outpatient  : 1975     Plan of Care/Recert ends      [] Pt refused/declined therapy at this time due to:           [x] Pt cancelled due to:  [] No Reason Given   [] Sick/ill   [x] Other:  provider out     [] Evaluation held by RN/Provider due to:    [] High Heart Rate   [] High Blood Pressure   [] Orthopedic Consult   [] Hgb < 7   [] Other:    [] Pt ordered brace per physician request:  [] Proper fit will be completed and education for wearing/skin checks    [] Pt does not require skilled services due to:      Therapist/Assistant will attempt to see this patient, at our earliest opportunity.       Raghav Paris Date: 2025

## 2025-03-03 ENCOUNTER — HOSPITAL ENCOUNTER (OUTPATIENT)
Dept: PHYSICAL THERAPY | Age: 50
Setting detail: THERAPIES SERIES
Discharge: HOME | End: 2025-03-03
Payer: COMMERCIAL

## 2025-03-03 PROCEDURE — 97110 THERAPEUTIC EXERCISES: CPT

## 2025-03-03 NOTE — PROGRESS NOTES
environment.-met    Long Term Goals  Time Frame for Long Term Goals : 4 weeks  Long Term Goal 1: Patient will be independent and compliant with a HEP-met  Long Term Goal 2: Patient will improve bilateral hip flexion ROM to >/= 110* and internal rotation ROM to >35* to decrease pain with ADLs. (3/3: B hip flexion: 125*, L IR: 25*, R IR: 42*)  Long Term Goal 3: Patient will improve bilateral hip strength to >/= 4/5 in all major joints and planes-met (3/3: 4/5 strength grossly)  Long Term Goal 4: Patient will report decreased pain to < 6/10 at worst.-met (3/3: 0/10 pain)  Long Term Goal 5: Patient will report 70% improvement in overall symptoms and function.-met (3/3: 100% improvement)    Minutes Tracking:  Time In: 1715  Time Out: 1729  Minutes: 14  Timed Code Treatment Minutes: 13 Minutes      Arslan Roach PT, DPT     Date: 3/3/2025

## 2025-04-18 ENCOUNTER — HOSPITAL ENCOUNTER
Dept: HOSPITAL 101 - LAB | Age: 50
Discharge: HOME | End: 2025-04-18
Payer: COMMERCIAL

## 2025-04-18 DIAGNOSIS — Z00.00: Primary | ICD-10-CM

## 2025-04-18 DIAGNOSIS — R73.09: ICD-10-CM

## 2025-04-18 DIAGNOSIS — E03.9: ICD-10-CM

## 2025-04-18 LAB
ADD MANUAL DIFF: NO
ALANINE AMINOTRANSFERASE: 42 U/L (ref 14–59)
ALBUMIN GLOBULIN RATIO: 1.4
ALBUMIN LEVEL: 3.9 G/DL (ref 3.4–5)
ALKALINE PHOSPHATASE: 59 U/L (ref 46–116)
ANION GAP: 9.5
ASPARTATE AMINO TRANSFERASE: 23 U/L (ref 15–37)
CALCIUM: 8.8 MG/DL (ref 8.5–10.1)
CHLORIDE: 103 MMOL/L (ref 98–107)
CHOLESTEROL: 151 MG/DL (ref ?–200)
CO2 BLD-SCNC: 28.9 MMOL/L (ref 21–32)
ESTIMATED GFR (AFRICAN AMERICA: >60
ESTIMATED GFR (NON-AFRICAN AME: >60
GLOBULIN: 2.8 G/DL
GLUCOSE SERPLBLD-MCNC: 110 MG/DL (ref 74–106)
HCT VFR BLD CALC: 40.9 % (ref 36–48)
HDL CHOLESTEROL: 61 MG/DL (ref 40–60)
HEMOGLOBIN: 13.7 G/DL (ref 12–16)
IMMATURE GRANULOCYTES ABS AUTO: 0.01 10^3/UL (ref 0–0.03)
IMMATURE GRANULOCYTES PCT AUTO: 0.2 % (ref 0–0.5)
LYMPHOCYTES  ABSOLUTE AUTO: 1.3 10^3/UL (ref 1.2–3.8)
MCH RBC QN AUTO: 31.4 PG (ref 26.7–34)
MCV RBC: 93.6 FL (ref 81–99)
MEAN CORPUSCULAR HGB CONC: 33.5 G/DL (ref 29.9–35.2)
PLATELET # BLD: 207 10^3/UL (ref 150–450)
POTASSIUM SERPLBLD-SCNC: 4.4 MMOL/L (ref 3.5–5.1)
RBC # BLD AUTO: 4.37 10^6/UL (ref 4.2–5.4)
SODIUM BLD-SCNC: 137 MMOL/L (ref 136–145)
THYROID STIMULATING HORMONE: 3.17 UIU/ML (ref 0.36–3.74)
TOTAL PROTEIN: 6.7 G/DL (ref 6.4–8.2)
TRIGLYCERIDES: 88 MG/DL (ref ?–150)
VLDL CHOLESTEROL: 17.6 MG/DL
WBC # BLD: 4.7 10^3/UL (ref 4–11)

## 2025-04-18 PROCEDURE — 84481 FREE ASSAY (FT-3): CPT

## 2025-04-18 PROCEDURE — 84443 ASSAY THYROID STIM HORMONE: CPT

## 2025-04-18 PROCEDURE — 85025 COMPLETE CBC W/AUTO DIFF WBC: CPT

## 2025-04-18 PROCEDURE — 36415 COLL VENOUS BLD VENIPUNCTURE: CPT

## 2025-04-18 PROCEDURE — 83036 HEMOGLOBIN GLYCOSYLATED A1C: CPT

## 2025-04-18 PROCEDURE — 80053 COMPREHEN METABOLIC PANEL: CPT

## 2025-04-18 PROCEDURE — 84436 ASSAY OF TOTAL THYROXINE: CPT

## 2025-04-18 PROCEDURE — 80061 LIPID PANEL: CPT

## 2025-05-22 ENCOUNTER — HOSPITAL ENCOUNTER
Dept: HOSPITAL 101 - LAB | Age: 50
Discharge: HOME | End: 2025-05-22
Payer: COMMERCIAL

## 2025-05-22 DIAGNOSIS — E03.9: Primary | ICD-10-CM

## 2025-05-22 LAB
FREE T3: 3.15 PG/ML (ref 2.18–3.98)
THYROID STIMULATING HORMONE: 1.43 UIU/ML (ref 0.36–3.74)

## 2025-05-22 PROCEDURE — 36415 COLL VENOUS BLD VENIPUNCTURE: CPT

## 2025-05-22 PROCEDURE — 84443 ASSAY THYROID STIM HORMONE: CPT

## 2025-05-22 PROCEDURE — 84436 ASSAY OF TOTAL THYROXINE: CPT

## 2025-05-22 PROCEDURE — 84481 FREE ASSAY (FT-3): CPT

## 2025-06-14 ENCOUNTER — HOSPITAL ENCOUNTER
Age: 50
Discharge: HOME | End: 2025-06-14
Payer: COMMERCIAL

## 2025-06-14 DIAGNOSIS — E03.9: Primary | ICD-10-CM

## 2025-06-14 LAB
FREE T3: 2.41 PG/ML (ref 2.18–3.98)
T4 THYROXINE: 5.6 UG/DL (ref 4.8–13.9)
THYROID STIMULATING HORMONE: 0.47 UIU/ML (ref 0.36–3.74)

## 2025-06-14 PROCEDURE — 84481 FREE ASSAY (FT-3): CPT

## 2025-06-14 PROCEDURE — 36415 COLL VENOUS BLD VENIPUNCTURE: CPT

## 2025-06-14 PROCEDURE — 84436 ASSAY OF TOTAL THYROXINE: CPT

## 2025-06-14 PROCEDURE — 84443 ASSAY THYROID STIM HORMONE: CPT

## 2025-06-16 ENCOUNTER — CLINICAL DOCUMENTATION (OUTPATIENT)
Dept: PHYSICAL THERAPY | Age: 50
End: 2025-06-16

## 2025-06-16 NOTE — FLOWSHEET NOTE
Phone: [phone]                 [facility]          Fax: [phone]                            Outpatient Physical Therapy                                                                    Discharge Summary    Patient: Jemma Olmstead  : 1975  [facility] #: [id number]   Referring Physician: Darine Keller MD     Date Treatment Initiated: 25  Date of Last Treatment: 3/3/25    Frequency/Duration  Plan Frequency: 2  Plan weeks: 4        Treatment Received   HP/CP      [x] Electrical Stim   [x] Therapeutic Exercise      [x] Gait Training  [x] Aquatics   [] Ultrasound         [x] Patient Education/HEP   [x] Manual Therapy  [] Traction    [x] Neuro-terry        [x] Soft Tissue Mobs            [x] Therapeutic Activity  [] Iontophoresis                        [] Orthotic casting/fitting      [x] Dry Needling  [] Blood Flow Restriction       [] Vasopneumatic Compression   [x] Vasopneumatic Compression/Cold  [] Vestibular Rehabilitation    Assessment  Assessment: Patient attended 8 PT visits for B hip pain and met all goals for independence with HEP, improved B hip ROM flexion: 125*, L hip IR: 25*, R hip IR: 42*, improved B LE strength: 4/5 grossly, and decreased pain, 0/10 for the past week. Pt is compliant with HEP and was given open swim program/passes to use as needed. Will place patient on 2 week hold and then d/c prn d/t all goals met.       Goals  Short Term Goals  Time Frame for Short Term Goals: 2 weeks  Short Term Goal 1: Patient will be initiated with a HEP-met  Short Term Goal 2: Patient will tolerate 30 minutes of aquatic exercise to improve bilateral hip ROM and strength in an unloaded environment.-met     Long Term Goals  Time Frame for Long Term Goals : 4 weeks  Long Term Goal 1: Patient will be independent and compliant with a HEP-met  Long Term Goal 2: Patient will improve bilateral hip flexion ROM to >/= 110* and internal rotation ROM to >35* to decrease pain with ADLs. (3/3: B hip